# Patient Record
Sex: MALE | Race: WHITE | NOT HISPANIC OR LATINO | URBAN - METROPOLITAN AREA
[De-identification: names, ages, dates, MRNs, and addresses within clinical notes are randomized per-mention and may not be internally consistent; named-entity substitution may affect disease eponyms.]

---

## 2024-11-04 ENCOUNTER — OUTPATIENT (OUTPATIENT)
Dept: OUTPATIENT SERVICES | Facility: HOSPITAL | Age: 69
LOS: 1 days | End: 2024-11-04
Payer: MEDICARE

## 2024-11-04 ENCOUNTER — TRANSCRIPTION ENCOUNTER (OUTPATIENT)
Age: 69
End: 2024-11-04

## 2024-11-04 DIAGNOSIS — M25.521 PAIN IN RIGHT ELBOW: ICD-10-CM

## 2024-11-04 DIAGNOSIS — Z00.8 ENCOUNTER FOR OTHER GENERAL EXAMINATION: ICD-10-CM

## 2024-11-04 PROCEDURE — 76882 US LMTD JT/FCL EVL NVASC XTR: CPT | Mod: 26,RT

## 2024-11-04 PROCEDURE — 76882 US LMTD JT/FCL EVL NVASC XTR: CPT | Mod: RT

## 2024-11-05 DIAGNOSIS — M25.521 PAIN IN RIGHT ELBOW: ICD-10-CM

## 2024-11-14 PROBLEM — Z00.00 ENCOUNTER FOR PREVENTIVE HEALTH EXAMINATION: Status: ACTIVE | Noted: 2024-11-14

## 2024-12-10 ENCOUNTER — APPOINTMENT (OUTPATIENT)
Dept: ORTHOPEDIC SURGERY | Facility: CLINIC | Age: 69
End: 2024-12-10
Payer: MEDICARE

## 2024-12-10 DIAGNOSIS — M70.21 OLECRANON BURSITIS, RIGHT ELBOW: ICD-10-CM

## 2024-12-10 PROCEDURE — 99204 OFFICE O/P NEW MOD 45 MIN: CPT

## 2025-01-04 ENCOUNTER — INPATIENT (INPATIENT)
Facility: HOSPITAL | Age: 70
LOS: 0 days | Discharge: HOME | DRG: 195 | End: 2025-01-04
Attending: STUDENT IN AN ORGANIZED HEALTH CARE EDUCATION/TRAINING PROGRAM | Admitting: HOSPITALIST
Payer: MEDICARE

## 2025-01-04 VITALS
HEIGHT: 76 IN | WEIGHT: 207.9 LBS | HEART RATE: 90 BPM | RESPIRATION RATE: 20 BRPM | OXYGEN SATURATION: 98 % | TEMPERATURE: 99 F | DIASTOLIC BLOOD PRESSURE: 68 MMHG | SYSTOLIC BLOOD PRESSURE: 136 MMHG

## 2025-01-04 DIAGNOSIS — R29.6 REPEATED FALLS: ICD-10-CM

## 2025-01-04 LAB
ALBUMIN SERPL ELPH-MCNC: 4 G/DL — SIGNIFICANT CHANGE UP (ref 3.5–5.2)
ALP SERPL-CCNC: 72 U/L — SIGNIFICANT CHANGE UP (ref 30–115)
ALT FLD-CCNC: 16 U/L — SIGNIFICANT CHANGE UP (ref 0–41)
ANION GAP SERPL CALC-SCNC: 14 MMOL/L — SIGNIFICANT CHANGE UP (ref 7–14)
AST SERPL-CCNC: 23 U/L — SIGNIFICANT CHANGE UP (ref 0–41)
BASOPHILS # BLD AUTO: 0.04 K/UL — SIGNIFICANT CHANGE UP (ref 0–0.2)
BASOPHILS NFR BLD AUTO: 0.4 % — SIGNIFICANT CHANGE UP (ref 0–1)
BILIRUB SERPL-MCNC: 0.3 MG/DL — SIGNIFICANT CHANGE UP (ref 0.2–1.2)
BUN SERPL-MCNC: 15 MG/DL — SIGNIFICANT CHANGE UP (ref 10–20)
CALCIUM SERPL-MCNC: 8.8 MG/DL — SIGNIFICANT CHANGE UP (ref 8.4–10.5)
CHLORIDE SERPL-SCNC: 96 MMOL/L — LOW (ref 98–110)
CK SERPL-CCNC: 661 U/L — HIGH (ref 0–225)
CO2 SERPL-SCNC: 23 MMOL/L — SIGNIFICANT CHANGE UP (ref 17–32)
CREAT SERPL-MCNC: 1 MG/DL — SIGNIFICANT CHANGE UP (ref 0.7–1.5)
EGFR: 82 ML/MIN/1.73M2 — SIGNIFICANT CHANGE UP
EOSINOPHIL # BLD AUTO: 0.06 K/UL — SIGNIFICANT CHANGE UP (ref 0–0.7)
EOSINOPHIL NFR BLD AUTO: 0.6 % — SIGNIFICANT CHANGE UP (ref 0–8)
FLUAV AG NPH QL: DETECTED
FLUBV AG NPH QL: SIGNIFICANT CHANGE UP
GLUCOSE SERPL-MCNC: 133 MG/DL — HIGH (ref 70–99)
HCT VFR BLD CALC: 36 % — LOW (ref 42–52)
HGB BLD-MCNC: 12.6 G/DL — LOW (ref 14–18)
IMM GRANULOCYTES NFR BLD AUTO: 0.3 % — SIGNIFICANT CHANGE UP (ref 0.1–0.3)
LYMPHOCYTES # BLD AUTO: 0.99 K/UL — LOW (ref 1.2–3.4)
LYMPHOCYTES # BLD AUTO: 10.4 % — LOW (ref 20.5–51.1)
MCHC RBC-ENTMCNC: 30.1 PG — SIGNIFICANT CHANGE UP (ref 27–31)
MCHC RBC-ENTMCNC: 35 G/DL — SIGNIFICANT CHANGE UP (ref 32–37)
MCV RBC AUTO: 85.9 FL — SIGNIFICANT CHANGE UP (ref 80–94)
MONOCYTES # BLD AUTO: 1.35 K/UL — HIGH (ref 0.1–0.6)
MONOCYTES NFR BLD AUTO: 14.1 % — HIGH (ref 1.7–9.3)
NEUTROPHILS # BLD AUTO: 7.08 K/UL — HIGH (ref 1.4–6.5)
NEUTROPHILS NFR BLD AUTO: 74.2 % — SIGNIFICANT CHANGE UP (ref 42.2–75.2)
NRBC # BLD: 0 /100 WBCS — SIGNIFICANT CHANGE UP (ref 0–0)
PLATELET # BLD AUTO: 262 K/UL — SIGNIFICANT CHANGE UP (ref 130–400)
PMV BLD: 9.5 FL — SIGNIFICANT CHANGE UP (ref 7.4–10.4)
POTASSIUM SERPL-MCNC: 3.7 MMOL/L — SIGNIFICANT CHANGE UP (ref 3.5–5)
POTASSIUM SERPL-SCNC: 3.7 MMOL/L — SIGNIFICANT CHANGE UP (ref 3.5–5)
PROT SERPL-MCNC: 6.4 G/DL — SIGNIFICANT CHANGE UP (ref 6–8)
RBC # BLD: 4.19 M/UL — LOW (ref 4.7–6.1)
RBC # FLD: 12.7 % — SIGNIFICANT CHANGE UP (ref 11.5–14.5)
RSV RNA NPH QL NAA+NON-PROBE: SIGNIFICANT CHANGE UP
SARS-COV-2 RNA SPEC QL NAA+PROBE: SIGNIFICANT CHANGE UP
SODIUM SERPL-SCNC: 133 MMOL/L — LOW (ref 135–146)
WBC # BLD: 9.55 K/UL — SIGNIFICANT CHANGE UP (ref 4.8–10.8)
WBC # FLD AUTO: 9.55 K/UL — SIGNIFICANT CHANGE UP (ref 4.8–10.8)

## 2025-01-04 PROCEDURE — 73630 X-RAY EXAM OF FOOT: CPT | Mod: 26,LT

## 2025-01-04 PROCEDURE — 73030 X-RAY EXAM OF SHOULDER: CPT | Mod: 26,50

## 2025-01-04 PROCEDURE — 99285 EMERGENCY DEPT VISIT HI MDM: CPT | Mod: 25

## 2025-01-04 PROCEDURE — 71045 X-RAY EXAM CHEST 1 VIEW: CPT | Mod: 26

## 2025-01-04 PROCEDURE — 74177 CT ABD & PELVIS W/CONTRAST: CPT | Mod: 26,MC

## 2025-01-04 PROCEDURE — 73030 X-RAY EXAM OF SHOULDER: CPT | Mod: 50

## 2025-01-04 PROCEDURE — 71260 CT THORAX DX C+: CPT | Mod: 26,MC

## 2025-01-04 PROCEDURE — 72125 CT NECK SPINE W/O DYE: CPT | Mod: 26,MC

## 2025-01-04 PROCEDURE — 73552 X-RAY EXAM OF FEMUR 2/>: CPT | Mod: RT

## 2025-01-04 PROCEDURE — 70450 CT HEAD/BRAIN W/O DYE: CPT | Mod: 26,MC

## 2025-01-04 PROCEDURE — 72170 X-RAY EXAM OF PELVIS: CPT | Mod: 26

## 2025-01-04 PROCEDURE — 12002 RPR S/N/AX/GEN/TRNK2.6-7.5CM: CPT

## 2025-01-04 PROCEDURE — 73552 X-RAY EXAM OF FEMUR 2/>: CPT | Mod: 26,RT

## 2025-01-04 RX ORDER — CHLORHEXIDINE GLUCONATE 1.2 MG/ML
1 RINSE ORAL
Refills: 0 | Status: DISCONTINUED | OUTPATIENT
Start: 2025-01-04 | End: 2025-01-04

## 2025-01-04 RX ORDER — SODIUM CHLORIDE 9 MG/ML
1000 INJECTION, SOLUTION INTRAMUSCULAR; INTRAVENOUS; SUBCUTANEOUS ONCE
Refills: 0 | Status: COMPLETED | OUTPATIENT
Start: 2025-01-04 | End: 2025-01-04

## 2025-01-04 RX ADMIN — SODIUM CHLORIDE 1000 MILLILITER(S): 9 INJECTION, SOLUTION INTRAMUSCULAR; INTRAVENOUS; SUBCUTANEOUS at 05:42

## 2025-01-04 NOTE — H&P ADULT - NSHPLABSRESULTS_GEN_ALL_CORE
LABS:                        12.6   9.55  )-----------( 262      ( 04 Jan 2025 05:31 )             36.0     01-04    133[L]  |  96[L]  |  15  ----------------------------<  133[H]  3.7   |  23  |  1.0    Ca    8.8      04 Jan 2025 05:31    TPro  6.4  /  Alb  4.0  /  TBili  0.3  /  DBili  x   /  AST  23  /  ALT  16  /  AlkPhos  72  01-04    LIVER FUNCTIONS - ( 04 Jan 2025 05:31 )  Alb: 4.0 g/dL / Pro: 6.4 g/dL / ALK PHOS: 72 U/L / ALT: 16 U/L / AST: 23 U/L / GGT: x             Urinalysis Basic - ( 04 Jan 2025 05:31 )    Color: x / Appearance: x / SG: x / pH: x  Gluc: 133 mg/dL / Ketone: x  / Bili: x / Urobili: x   Blood: x / Protein: x / Nitrite: x   Leuk Esterase: x / RBC: x / WBC x   Sq Epi: x / Non Sq Epi: x / Bacteria: x  +++++++++++++++++++++++++++++++++++++++++++++++++++++++++++++++++++++++++++++++++++++++++++++++++  < from: CT Chest w/ IV Cont (01.04.25 @ 07:24) >    IMPRESSION:  No evidence of acute traumatic injury to the chest, abdomen or pelvis.    --- End of Report ---    FABIAN RUDD MD; Attending Radiologist  This document has been electronically signed. Jan 4 2025  8:00AM    < end of copied text >  +++++++++++++++++++++++++++++++++++++++++++++++++++++++++++++++++++++++++++++++++++++++++++++++++++  < from: CT Cervical Spine No Cont (01.04.25 @ 07:13) >  IMPRESSION:  CT HEAD:  1.  No evidence of acute intracranial pathology.    CT CERVICAL SPINE:  1.  No evidence of acute cervical spine traumatic injury.    --- End of Report ---    FABIAN RUDD MD; Attending Radiologist  This document has been electronically signed. Jan 4 2025  7:45AM  < end of copied text >    < from: Xray Pelvis AP only (01.04.25 @ 06:17) >  < end of copied text >

## 2025-01-04 NOTE — CHART NOTE - NSCHARTNOTEFT_GEN_A_CORE
Upon seeing the patient with ACP at bedside as well as daughter who reports she is a nurse - the daughter and the patient are not willing to be admitted to the hospital and requested to speak to ed staff and not admitting team. they prefer to go home and take care of the patient. reported the patient will be seeing by his podiatrist tomorrow and they have multiple family members to take care of him at home and declined admission for physical therapy to prevent falls understanding the risks.  Admission was declined by patient and family. Per chart review - patient was signed out AMA from emergency room. Please recall medicine prn should patient / family agree for being admitting and stay in the hospital.

## 2025-01-04 NOTE — CHART NOTE - NSCHARTNOTEFT_GEN_A_CORE
Patient was seen and evaluated in the ER with hospitalist Dr. Jean. Patient reports he was not aware of his admission and wishes to sign out AMA at this time.     The risks of refusing admission, treatment and seriousness of leaving AMA (such as the risks of heart attack, stroke, seizure, and even death) and their medical conditions, benefits, and alternatives to treatment was discussed with the patient and patients daughter.     Patients daughter at the bedside and patients wife Fatou via telephone who expresses that she is a nurse and will care for the patient at home. Family reached podiatrist Dr. Faulkner who suggested to see the patient in 1 day in the office.     Patient is AAO x 3, ambulating in the ER with a steady gait. On exam lungs CTA, no difficulty breathing.     Patient was advised to report to the ER with any worsening symptoms including chest pain, SOB, headache, fall.       Vital Signs Last 24 Hrs  T(C): 37.3 (04 Jan 2025 05:08), Max: 37.3 (04 Jan 2025 05:08)  T(F): 99.1 (04 Jan 2025 05:08), Max: 99.1 (04 Jan 2025 05:08)  HR: 90 (04 Jan 2025 05:08) (90 - 90)  BP: 136/68 (04 Jan 2025 05:08) (136/68 - 136/68)  RR: 20 (04 Jan 2025 05:08) (20 - 20)  SpO2: 98% (04 Jan 2025 05:08) (98% - 98%)    Parameters below as of 04 Jan 2025 05:08  Patient On (Oxygen Delivery Method): room air Patient was seen and evaluated in the ER with hospitalist Dr. Jean. Patient reports he was not aware of his admission and wishes to sign out AMA at this time.     The risks of refusing admission, treatment and seriousness of leaving AMA (such as the risks of heart attack, stroke, seizure, and even death) and their medical conditions, benefits, and alternatives to treatment was discussed with the patient and patients daughter.     Patients daughter at the bedside and patients wife Fatou via telephone who expresses that she is a nurse and will care for the patient at home. Family reached podiatrist Dr. Faulkner who suggested to see the patient in 1 day in the office.     Patient is AAO x 3 and has medical capacity to make decisions. Patient ambulating in the ER with a steady gait. On exam lungs CTA, no difficulty breathing.     Patient was advised to report to the ER with any worsening symptoms including chest pain, SOB, headache, fall.       Vital Signs Last 24 Hrs  T(C): 37.3 (04 Jan 2025 05:08), Max: 37.3 (04 Jan 2025 05:08)  T(F): 99.1 (04 Jan 2025 05:08), Max: 99.1 (04 Jan 2025 05:08)  HR: 90 (04 Jan 2025 05:08) (90 - 90)  BP: 136/68 (04 Jan 2025 05:08) (136/68 - 136/68)  RR: 20 (04 Jan 2025 05:08) (20 - 20)  SpO2: 98% (04 Jan 2025 05:08) (98% - 98%)    Parameters below as of 04 Jan 2025 05:08  Patient On (Oxygen Delivery Method): room air

## 2025-01-04 NOTE — ED ADULT TRIAGE NOTE - AVIAN FLU SYMPTOMS
Yes W Plasty Text: The lesion was extirpated to the level of the fat with a #15 scalpel blade. Given the location of the defect, shape of the defect and the proximity to free margins a W-plasty was deemed most appropriate for repair. Using a sterile surgical marker, the appropriate transposition arms of the W-plasty were drawn incorporating the defect and placing the expected incisions within the relaxed skin tension lines where possible. The area thus outlined was incised deep to adipose tissue with a #15 scalpel blade. The skin margins were undermined to an appropriate distance in all directions utilizing iris scissors. The opposing transposition arms were then transposed and carried over into place in opposite direction and anchored with interrupted buried subcutaneous sutures.

## 2025-01-04 NOTE — ED ADULT NURSE NOTE - NSFALLRISKINTERV_ED_ALL_ED
Assistance OOB with selected safe patient handling equipment if applicable/Assistance with ambulation/Communicate fall risk and risk factors to all staff, patient, and family/Monitor gait and stability/Provide visual cue: yellow wristband, yellow gown, etc/Reinforce activity limits and safety measures with patient and family/Call bell, personal items and telephone in reach/Instruct patient to call for assistance before getting out of bed/chair/stretcher/Non-slip footwear applied when patient is off stretcher/[place] to call system/Physically safe environment - no spills, clutter or unnecessary equipment/Purposeful Proactive Rounding/Room/bathroom lighting operational, light cord in reach

## 2025-01-04 NOTE — ED ADULT NURSE REASSESSMENT NOTE - NS ED NURSE REASSESS COMMENT FT1
saw medical team, refusing to continue with admission, AMA papers signed by admitting team and patient, IVL removed, left with family member

## 2025-01-04 NOTE — ED PROVIDER NOTE - CARE PLAN
1 Principal Discharge DX:	Influenza A  Secondary Diagnosis:	Frequent falls  Secondary Diagnosis:	Generalized weakness  Secondary Diagnosis:	Laceration of toe

## 2025-01-04 NOTE — ED PROVIDER NOTE - ATTENDING APP SHARED VISIT CONTRIBUTION OF CARE
69-year-old male, history of DM, RA, tested positive for flu, presents to the ED with multiple falls and weakness today.  Exam shows alert patient in no distress, HEENT NCAT PERRL, neck supple, throat no exudates, lungs clear, RR S1S2, abdomen soft NT +BS, no CCE, + laceration left distal second toe, neuro alert and oriented x 3, GCS 15, no deficits.

## 2025-01-04 NOTE — ED PROVIDER NOTE - PHYSICAL EXAMINATION
GENERAL: Well-nourished, Well-developed. NAD.  HEAD: No visible or palpable bumps or hematomas. No ecchymosis behind ears B/L.  Eyes: PERRLA, EOMI.   Neck: Supple. No cervical midline TTP. No paravertebral TTP to traps. FROM  CVS: Normal S1,S2. No murmurs appreciated on auscultation   RESP: No use of accessory muscles. Chest rise symmetrical with good expansion. Lungs clear to auscultation B/L. No wheezing, rales, or rhonchi auscultated.  GI: Normal auscultation of bowel sounds in all 4 quadrants. Soft, Nontender, Nondistended. No guarding or rebound tenderness. No CVAT B/L.  MSK: (+)R femur mild ttp. pelvis stable. FROM of upper and lower extremities B/L. No midline spinal TTP. FROM of back with flexion and extension.  Skin: (+)3cm laceration to distal 2nd toe, through the nail.   EXT: Radial and pedal pulses present B/L. No calf tenderness or swelling B/L. No palpable cords. No pedal edema B/L.  Neuro: NVI

## 2025-01-04 NOTE — ED PROVIDER NOTE - PROGRESS NOTE DETAILS
Signed out to Dr. Carvalho. TC: Received signout from Dr. Bunch. Pt here with URI sx and frequent falls. Tested flu + yesterday. Has had multiple falls at home from feeling weak. No prodromal sx. Endorses some cp from cough, no ischemic changes on ekg. Pan scan negative. L toe lac repaired. Xrays negative. Pt to be admitted for frequent falls.

## 2025-01-04 NOTE — ED PROVIDER NOTE - OBJECTIVE STATEMENT
69-year-old male PMH diabetes, RA presenting to ED for evaluation of generalized weakness and multiple falls in the past 24 hours.  [facility] and since that time is been increasingly weak at home has reported multiple falls.  Endorses a head injury.  Patient with a laceration to his left second toe from fall last night.  Denies any loss of consciousness, not on anticoagulation.

## 2025-01-04 NOTE — CHART NOTE - NSCHARTNOTEFT_GEN_A_CORE
Pt was already admitted to medicine and seen by hospitalist. He wants to leave AMA. Initially family members pushed for admission however now daughter at bedside says she will take him home with other family members and monitor him. Pt is awake, alert, interactive with normal mental status and neuro function. Pt denies SI/HI, demonstrates normal thought processes without evidence of intoxication, delirium, delusions, hallucinations. Pt requesting to leave AMA. Advised pt of potential risks of leaving AMA, including potential disability or death. Attempted to convince pt to stay and continue work up/tx and pt refused. Pt has capacity to make medical decisions at this time and will be signed out AMA. Instructed to f/u with PMD as outpt and is aware can return to the ED at any time for eval.

## 2025-01-04 NOTE — ED PROVIDER NOTE - CLINICAL SUMMARY MEDICAL DECISION MAKING FREE TEXT BOX
Received signout from Dr. Bunch. Pt here with URI sx and frequent falls. Tested flu + yesterday. Has had multiple falls at home from feeling weak. No prodromal sx. Endorses some cp from cough, no ischemic changes on ekg. Pan scan negative. L toe lac repaired. Xrays negative. Pt to be admitted for frequent falls given risk for worsening injuries, unsafe d/c home.

## 2025-01-04 NOTE — H&P ADULT - ASSESSMENT
Assessment:        Plan:    # Generalized weakness/ frequent falls   # Elevated CK   # Influenza A (+)  - Admit to inpatient level of care-med/surg  - Isolation precautions  - Start Tamiflu  - Robitussin PRN for cough  - Trend Creatine kinase   - Continue with IV fluids  - Fall risk  - Ambulate with assistance  - PT evaluation  - Social work   - Case management        # Laceration to L distal 2nd toe  - laceration repair done by ER team  - Podiatry consulted       CHG ordered.  VTE ppx: __  GI ppx: Not indicated.  Diet: DASH      Awaiting to discuss with Dr. Jean

## 2025-01-09 DIAGNOSIS — Z53.29 PROCEDURE AND TREATMENT NOT CARRIED OUT BECAUSE OF PATIENT'S DECISION FOR OTHER REASONS: ICD-10-CM

## 2025-01-09 DIAGNOSIS — J10.1 INFLUENZA DUE TO OTHER IDENTIFIED INFLUENZA VIRUS WITH OTHER RESPIRATORY MANIFESTATIONS: ICD-10-CM

## 2025-01-09 DIAGNOSIS — S91.115A LACERATION WITHOUT FOREIGN BODY OF LEFT LESSER TOE(S) WITHOUT DAMAGE TO NAIL, INITIAL ENCOUNTER: ICD-10-CM
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
## 2025-01-09 DIAGNOSIS — R29.6 REPEATED FALLS: ICD-10-CM

## 2025-01-09 DIAGNOSIS — E11.9 TYPE 2 DIABETES MELLITUS WITHOUT COMPLICATIONS: ICD-10-CM
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
## 2025-01-16 ENCOUNTER — APPOINTMENT (OUTPATIENT)
Dept: ORTHOPEDIC SURGERY | Facility: CLINIC | Age: 70
End: 2025-01-16
Payer: MEDICARE

## 2025-01-16 DIAGNOSIS — M25.511 PAIN IN RIGHT SHOULDER: ICD-10-CM

## 2025-01-16 DIAGNOSIS — M25.512 PAIN IN RIGHT SHOULDER: ICD-10-CM

## 2025-01-16 PROCEDURE — 73030 X-RAY EXAM OF SHOULDER: CPT | Mod: 50

## 2025-01-16 PROCEDURE — 99203 OFFICE O/P NEW LOW 30 MIN: CPT

## 2025-01-28 ENCOUNTER — APPOINTMENT (OUTPATIENT)
Dept: MRI IMAGING | Facility: CLINIC | Age: 70
End: 2025-01-28
Payer: MEDICARE

## 2025-01-28 PROCEDURE — 73221 MRI JOINT UPR EXTREM W/O DYE: CPT | Mod: LT

## 2025-01-29 NOTE — H&P ADULT - NSHPPHYSICALEXAM_GEN_ALL_CORE
CHW - Initial Contact    This Community Health Worker completed the Social Determinant of Health questionnaire with patient via telephone today.    Pt identified barriers of most importance are: Patient did not report any barriers at this time.   Referrals to community agencies completed with patient/caregiver consent outside of [facility] include: No.  Referrals were put through [facility] - no:   Support and Services: No.  Other information discussed the patient needs / wants help with: SDOH completed. Patient did not report any barriers at this time. Patient's case will be closed.    Follow up required: No.    
Vital Signs Last 24 Hrs  T(C): 37.3 (04 Jan 2025 05:08), Max: 37.3 (04 Jan 2025 05:08)  T(F): 99.1 (04 Jan 2025 05:08), Max: 99.1 (04 Jan 2025 05:08)  HR: 90 (04 Jan 2025 05:08) (90 - 90)  BP: 136/68 (04 Jan 2025 05:08) (136/68 - 136/68)  RR: 20 (04 Jan 2025 05:08) (20 - 20)  SpO2: 98% (04 Jan 2025 05:08) (98% - 98%)    Parameters below as of 04 Jan 2025 05:08  Patient On (Oxygen Delivery Method): room air    VITALS:     GENERAL: NAD, lying in bed comfortably  HEAD:  Atraumatic, Normocephalic  EYES: EOMI, PERRLA, conjunctiva and sclera clear  ENT: Moist mucous membranes  NECK: Supple, No JVD  CHEST/LUNG: Clear to auscultation bilaterally; No rales, rhonchi, wheezing, or rubs. Unlabored respirations  HEART: Regular rate and rhythm; No murmurs, rubs, or gallops  ABDOMEN: Bowel sounds present; Soft, Nontender, Nondistended. No hepatomegally  EXTREMITIES:  2+ Peripheral Pulses, brisk capillary refill. No clubbing, cyanosis, or edema  NERVOUS SYSTEM:  Alert & Oriented X3, speech clear. No deficits   MSK: FROM all 4 extremities, full and equal strength  SKIN: No rashes or lesions

## 2025-02-10 ENCOUNTER — INPATIENT (INPATIENT)
Facility: HOSPITAL | Age: 70
LOS: 3 days | Discharge: HOME HEALTH | DRG: 605 | End: 2025-02-14
Attending: STUDENT IN AN ORGANIZED HEALTH CARE EDUCATION/TRAINING PROGRAM | Admitting: INTERNAL MEDICINE
Payer: MEDICARE

## 2025-02-10 VITALS
SYSTOLIC BLOOD PRESSURE: 124 MMHG | WEIGHT: 199.96 LBS | OXYGEN SATURATION: 99 % | DIASTOLIC BLOOD PRESSURE: 72 MMHG | RESPIRATION RATE: 18 BRPM | HEIGHT: 76 IN | TEMPERATURE: 98 F | HEART RATE: 77 BPM

## 2025-02-10 DIAGNOSIS — S91.309A UNSPECIFIED OPEN WOUND, UNSPECIFIED FOOT, INITIAL ENCOUNTER: ICD-10-CM

## 2025-02-10 LAB
A1C WITH ESTIMATED AVERAGE GLUCOSE RESULT: 6.4 % — HIGH (ref 4–5.6)
ALBUMIN SERPL ELPH-MCNC: 4.2 G/DL — SIGNIFICANT CHANGE UP (ref 3.5–5.2)
ALP SERPL-CCNC: 75 U/L — SIGNIFICANT CHANGE UP (ref 30–115)
ALT FLD-CCNC: 13 U/L — SIGNIFICANT CHANGE UP (ref 0–41)
ANION GAP SERPL CALC-SCNC: 16 MMOL/L — HIGH (ref 7–14)
APTT BLD: 33.7 SEC — SIGNIFICANT CHANGE UP (ref 27–39.2)
AST SERPL-CCNC: 19 U/L — SIGNIFICANT CHANGE UP (ref 0–41)
BASOPHILS # BLD AUTO: 0.1 K/UL — SIGNIFICANT CHANGE UP (ref 0–0.2)
BASOPHILS NFR BLD AUTO: 1.1 % — HIGH (ref 0–1)
BILIRUB SERPL-MCNC: 0.3 MG/DL — SIGNIFICANT CHANGE UP (ref 0.2–1.2)
BUN SERPL-MCNC: 18 MG/DL — SIGNIFICANT CHANGE UP (ref 10–20)
CALCIUM SERPL-MCNC: 9.2 MG/DL — SIGNIFICANT CHANGE UP (ref 8.4–10.5)
CHLORIDE SERPL-SCNC: 101 MMOL/L — SIGNIFICANT CHANGE UP (ref 98–110)
CO2 SERPL-SCNC: 20 MMOL/L — SIGNIFICANT CHANGE UP (ref 17–32)
CREAT SERPL-MCNC: 1 MG/DL — SIGNIFICANT CHANGE UP (ref 0.7–1.5)
CRP SERPL-MCNC: 3.1 MG/L — SIGNIFICANT CHANGE UP
EGFR: 82 ML/MIN/1.73M2 — SIGNIFICANT CHANGE UP
EOSINOPHIL # BLD AUTO: 0.36 K/UL — SIGNIFICANT CHANGE UP (ref 0–0.7)
EOSINOPHIL NFR BLD AUTO: 3.8 % — SIGNIFICANT CHANGE UP (ref 0–8)
ERYTHROCYTE [SEDIMENTATION RATE] IN BLOOD: 18 MM/HR — HIGH (ref 0–10)
ESTIMATED AVERAGE GLUCOSE: 137 MG/DL — HIGH (ref 68–114)
GLUCOSE BLDC GLUCOMTR-MCNC: 129 MG/DL — HIGH (ref 70–99)
GLUCOSE SERPL-MCNC: 95 MG/DL — SIGNIFICANT CHANGE UP (ref 70–99)
HCT VFR BLD CALC: 36.6 % — LOW (ref 42–52)
HGB BLD-MCNC: 12.5 G/DL — LOW (ref 14–18)
IMM GRANULOCYTES NFR BLD AUTO: 0.4 % — HIGH (ref 0.1–0.3)
INR BLD: 0.94 RATIO — SIGNIFICANT CHANGE UP (ref 0.65–1.3)
LACTATE SERPL-SCNC: 2.4 MMOL/L — HIGH (ref 0.7–2)
LACTATE SERPL-SCNC: 2.7 MMOL/L — HIGH (ref 0.7–2)
LYMPHOCYTES # BLD AUTO: 2.12 K/UL — SIGNIFICANT CHANGE UP (ref 1.2–3.4)
LYMPHOCYTES # BLD AUTO: 22.3 % — SIGNIFICANT CHANGE UP (ref 20.5–51.1)
MCHC RBC-ENTMCNC: 29.9 PG — SIGNIFICANT CHANGE UP (ref 27–31)
MCHC RBC-ENTMCNC: 34.2 G/DL — SIGNIFICANT CHANGE UP (ref 32–37)
MCV RBC AUTO: 87.6 FL — SIGNIFICANT CHANGE UP (ref 80–94)
MONOCYTES # BLD AUTO: 0.81 K/UL — HIGH (ref 0.1–0.6)
MONOCYTES NFR BLD AUTO: 8.5 % — SIGNIFICANT CHANGE UP (ref 1.7–9.3)
NEUTROPHILS # BLD AUTO: 6.06 K/UL — SIGNIFICANT CHANGE UP (ref 1.4–6.5)
NEUTROPHILS NFR BLD AUTO: 63.9 % — SIGNIFICANT CHANGE UP (ref 42.2–75.2)
NRBC # BLD: 0 /100 WBCS — SIGNIFICANT CHANGE UP (ref 0–0)
NRBC BLD-RTO: 0 /100 WBCS — SIGNIFICANT CHANGE UP (ref 0–0)
PLATELET # BLD AUTO: 280 K/UL — SIGNIFICANT CHANGE UP (ref 130–400)
PMV BLD: 10.8 FL — HIGH (ref 7.4–10.4)
POTASSIUM SERPL-MCNC: 4.5 MMOL/L — SIGNIFICANT CHANGE UP (ref 3.5–5)
POTASSIUM SERPL-SCNC: 4.5 MMOL/L — SIGNIFICANT CHANGE UP (ref 3.5–5)
PROT SERPL-MCNC: 6.4 G/DL — SIGNIFICANT CHANGE UP (ref 6–8)
PROTHROM AB SERPL-ACNC: 11.1 SEC — SIGNIFICANT CHANGE UP (ref 9.95–12.87)
RBC # BLD: 4.18 M/UL — LOW (ref 4.7–6.1)
RBC # FLD: 13.4 % — SIGNIFICANT CHANGE UP (ref 11.5–14.5)
SODIUM SERPL-SCNC: 137 MMOL/L — SIGNIFICANT CHANGE UP (ref 135–146)
WBC # BLD: 9.49 K/UL — SIGNIFICANT CHANGE UP (ref 4.8–10.8)
WBC # FLD AUTO: 9.49 K/UL — SIGNIFICANT CHANGE UP (ref 4.8–10.8)

## 2025-02-10 PROCEDURE — 85025 COMPLETE CBC W/AUTO DIFF WBC: CPT

## 2025-02-10 PROCEDURE — 83605 ASSAY OF LACTIC ACID: CPT

## 2025-02-10 PROCEDURE — 73630 X-RAY EXAM OF FOOT: CPT | Mod: LT

## 2025-02-10 PROCEDURE — 99285 EMERGENCY DEPT VISIT HI MDM: CPT

## 2025-02-10 PROCEDURE — 87077 CULTURE AEROBIC IDENTIFY: CPT

## 2025-02-10 PROCEDURE — 73630 X-RAY EXAM OF FOOT: CPT | Mod: 26,LT

## 2025-02-10 PROCEDURE — 87186 SC STD MICRODIL/AGAR DIL: CPT

## 2025-02-10 PROCEDURE — 82962 GLUCOSE BLOOD TEST: CPT

## 2025-02-10 PROCEDURE — 85610 PROTHROMBIN TIME: CPT

## 2025-02-10 PROCEDURE — 97116 GAIT TRAINING THERAPY: CPT | Mod: GP

## 2025-02-10 PROCEDURE — 99223 1ST HOSP IP/OBS HIGH 75: CPT

## 2025-02-10 PROCEDURE — 97530 THERAPEUTIC ACTIVITIES: CPT | Mod: GP

## 2025-02-10 PROCEDURE — 87070 CULTURE OTHR SPECIMN AEROBIC: CPT

## 2025-02-10 PROCEDURE — 36415 COLL VENOUS BLD VENIPUNCTURE: CPT

## 2025-02-10 PROCEDURE — 88305 TISSUE EXAM BY PATHOLOGIST: CPT

## 2025-02-10 PROCEDURE — 97162 PT EVAL MOD COMPLEX 30 MIN: CPT | Mod: GP

## 2025-02-10 PROCEDURE — 87075 CULTR BACTERIA EXCEPT BLOOD: CPT

## 2025-02-10 PROCEDURE — 73719 MRI LOWER EXTREMITY W/DYE: CPT | Mod: MC,LT

## 2025-02-10 PROCEDURE — 80053 COMPREHEN METABOLIC PANEL: CPT

## 2025-02-10 PROCEDURE — 88311 DECALCIFY TISSUE: CPT

## 2025-02-10 PROCEDURE — A9579: CPT

## 2025-02-10 PROCEDURE — 93926 LOWER EXTREMITY STUDY: CPT | Mod: 26,LT

## 2025-02-10 RX ORDER — ACETAMINOPHEN 160 MG/5ML
650 SUSPENSION ORAL EVERY 6 HOURS
Refills: 0 | Status: DISCONTINUED | OUTPATIENT
Start: 2025-02-10 | End: 2025-02-13

## 2025-02-10 RX ORDER — METFORMIN HYDROCHLORIDE 1000 MG/1
1 TABLET, COATED ORAL
Refills: 0 | DISCHARGE

## 2025-02-10 RX ORDER — ACETAMINOPHEN, DIPHENHYDRAMINE HCL, PHENYLEPHRINE HCL 325; 25; 5 MG/1; MG/1; MG/1
5 TABLET ORAL AT BEDTIME
Refills: 0 | Status: DISCONTINUED | OUTPATIENT
Start: 2025-02-10 | End: 2025-02-13

## 2025-02-10 RX ORDER — BUPROPION HYDROCHLORIDE 150 MG/1
150 TABLET, EXTENDED RELEASE ORAL DAILY
Refills: 0 | Status: DISCONTINUED | OUTPATIENT
Start: 2025-02-10 | End: 2025-02-13

## 2025-02-10 RX ORDER — HYDROCHLOROTHIAZIDE 50 MG
10 TABLET ORAL
Refills: 0 | DISCHARGE

## 2025-02-10 RX ORDER — BACTERIOSTATIC SODIUM CHLORIDE 0.9 %
1000 VIAL (ML) INJECTION ONCE
Refills: 0 | Status: COMPLETED | OUTPATIENT
Start: 2025-02-10 | End: 2025-02-10

## 2025-02-10 RX ORDER — ENALAPRIL MALEATE 20 MG
30 TABLET ORAL DAILY
Refills: 0 | Status: DISCONTINUED | OUTPATIENT
Start: 2025-02-10 | End: 2025-02-13

## 2025-02-10 RX ORDER — PIPERACILLIN SODIUM AND TAZOBACTAM SODIUM 2; 250 G/50ML; MG/50ML
3.38 INJECTION, POWDER, FOR SOLUTION INTRAVENOUS EVERY 8 HOURS
Refills: 0 | Status: DISCONTINUED | OUTPATIENT
Start: 2025-02-10 | End: 2025-02-11

## 2025-02-10 RX ORDER — HYDROCHLOROTHIAZIDE 50 MG
1 TABLET ORAL
Refills: 0 | DISCHARGE

## 2025-02-10 RX ORDER — ENALAPRIL MALEATE 20 MG
10 TABLET ORAL DAILY
Refills: 0 | Status: DISCONTINUED | OUTPATIENT
Start: 2025-02-10 | End: 2025-02-10

## 2025-02-10 RX ORDER — DESVENLAFAXINE 100 MG/1
1 TABLET, FILM COATED, EXTENDED RELEASE ORAL
Refills: 0 | DISCHARGE

## 2025-02-10 RX ORDER — ENALAPRIL MALEATE 20 MG
1 TABLET ORAL
Refills: 0 | DISCHARGE

## 2025-02-10 RX ORDER — AMLODIPINE BESYLATE 5 MG
1 TABLET ORAL
Refills: 0 | DISCHARGE

## 2025-02-10 RX ORDER — ACETAMINOPHEN, DIPHENHYDRAMINE HCL, PHENYLEPHRINE HCL 325; 25; 5 MG/1; MG/1; MG/1
3 TABLET ORAL AT BEDTIME
Refills: 0 | Status: DISCONTINUED | OUTPATIENT
Start: 2025-02-10 | End: 2025-02-10

## 2025-02-10 RX ORDER — BUPROPION HYDROCHLORIDE 150 MG/1
1 TABLET, EXTENDED RELEASE ORAL
Refills: 0 | DISCHARGE

## 2025-02-10 RX ORDER — PIPERACILLIN SODIUM AND TAZOBACTAM SODIUM 2; 250 G/50ML; MG/50ML
3.38 INJECTION, POWDER, FOR SOLUTION INTRAVENOUS ONCE
Refills: 0 | Status: COMPLETED | OUTPATIENT
Start: 2025-02-10 | End: 2025-02-10

## 2025-02-10 RX ORDER — SEMAGLUTIDE 0.25 MG/.5ML
1 INJECTION, SOLUTION SUBCUTANEOUS
Refills: 0 | DISCHARGE

## 2025-02-10 RX ORDER — AMLODIPINE BESYLATE 5 MG
10 TABLET ORAL DAILY
Refills: 0 | Status: DISCONTINUED | OUTPATIENT
Start: 2025-02-10 | End: 2025-02-13

## 2025-02-10 RX ADMIN — PIPERACILLIN SODIUM AND TAZOBACTAM SODIUM 25 GRAM(S): 2; 250 INJECTION, POWDER, FOR SOLUTION INTRAVENOUS at 22:54

## 2025-02-10 RX ADMIN — PIPERACILLIN SODIUM AND TAZOBACTAM SODIUM 200 GRAM(S): 2; 250 INJECTION, POWDER, FOR SOLUTION INTRAVENOUS at 16:05

## 2025-02-10 RX ADMIN — ACETAMINOPHEN, DIPHENHYDRAMINE HCL, PHENYLEPHRINE HCL 5 MILLIGRAM(S): 325; 25; 5 TABLET ORAL at 23:20

## 2025-02-10 RX ADMIN — Medication 1000 MILLILITER(S): at 17:10

## 2025-02-10 NOTE — ED ADULT NURSE NOTE - CHIEF COMPLAINT QUOTE
Admission c/o wound check . Patient had a laceration in January , came  in for non healed wound and for IV antibiotics

## 2025-02-10 NOTE — H&P ADULT - NSHPLABSRESULTS_GEN_ALL_CORE
LABS:                        12.5   9.49  )-----------( 280      ( 10 Feb 2025 15:34 )             36.6     02-10    137  |  101  |  18  ----------------------------<  95  4.5   |  20  |  1.0    Ca    9.2      10 Feb 2025 15:34    TPro  6.4  /  Alb  4.2  /  TBili  0.3  /  DBili  x   /  AST  19  /  ALT  13  /  AlkPhos  75  02-10    PT/INR - ( 10 Feb 2025 16:25 )   PT: 11.10 sec;   INR: 0.94 ratio         PTT - ( 10 Feb 2025 16:25 )  PTT:33.7 sec  Urinalysis Basic - ( 10 Feb 2025 15:34 )    Color: x / Appearance: x / SG: x / pH: x  Gluc: 95 mg/dL / Ketone: x  / Bili: x / Urobili: x   Blood: x / Protein: x / Nitrite: x   Leuk Esterase: x / RBC: x / WBC x   Sq Epi: x / Non Sq Epi: x / Bacteria: x        Sedimentation Rate, Erythrocyte: 18 mm/Hr *H* (02-10-25 @ 16:25)  Lactate, Blood: 2.4 mmol/L *H* (02-10-25 @ 16:25)

## 2025-02-10 NOTE — ED PROVIDER NOTE - PHYSICAL EXAMINATION
Gen: NAD, AOx3  Head: NCAT  HEENT: PERRL, oral mucosa moist, normal conjunctiva, oropharynx clear without exudate or erythema  Lung: CTAB, no respiratory distress, no wheezing, rales, rhonchi  CV: normal s1/s2, rrr, Normal perfusion, pulses 2+ throughout  Abd: soft, NTND, no CVA tenderness  Genitourinary: no pelvic tenderness  MSK: No edema, no visible deformities, full range of motion in all 4 extremities  Neuro: CN II-XII grossly intact, No focal neurologic deficits  Skin: No rash, open wound to L 2nd toe with no active drainage/erythema/streaking/crepitus  Psych: normal affect

## 2025-02-10 NOTE — H&P ADULT - ASSESSMENT
69-year-old male with a past medical history of hypertension, diabetes, rheumatoid arthritis sent in by podiatry for left foot wound.  Patient obtained an laceration to his left second toe the beginning of last month and has experienced trouble healing since then.     69-year-old male with a past medical history of hypertension, diabetes, rheumatoid arthritis sent in by podiatry for left foot wound.  Patient obtained an laceration to his left second toe the beginning of last month and has experienced trouble healing since then.    #L. foot second digit toe wound  #Rule out osteomyelitis vs superficial infection  - wound started in January as a laceration, was on op Abx and received stiches  - wound not healing and getting worse despite treatment  - Foot xray: No definite acute displaced fracture or dislocation. Tarsal/metatarsal   alignment appears maintained. Stable postoperative changes of the great   toe and metatarsals. Osteopenia and degenerative changes without   difference  - Podiatry saw in ED: recommended MRI L foot for osteo for possible sx vs long term abx    plan:  - F/u MRI left foot   - c/w empiric zosyn   - f/u labs   - f/u [facility] arterial duplex  - f/u ID c/s     #HTN  - c/w home meds     #Diabities  - ISS    #RA  - used to take Enbrel but stopped    DVT: none  PPI  Diet: regular  dispo: pending MRI  69-year-old male with a past medical history of hypertension, diabetes, rheumatoid arthritis sent in by podiatry for left foot wound.  Patient obtained an laceration to his left second toe the beginning of last month and has experienced trouble healing since then.    #L. foot second digit toe wound  #Rule out osteomyelitis vs superficial infection  - wound started in January as a laceration, was on op Abx and received stiches  - wound not healing and getting worse despite treatment  - Foot xray: No definite acute displaced fracture or dislocation. Tarsal/metatarsal   alignment appears maintained. Stable postoperative changes of the great   toe and metatarsals. Osteopenia and degenerative changes without   difference  - Podiatry saw in ED: recommended MRI L foot for osteo for possible sx vs long term abx    plan:  - F/u MRI left foot  - c/w empiric zosyn   - f/u labs   - f/u [facility] arterial duplex  - f/u ID c/s     #HTN  - c/w home meds     #Diabetes  - ISS  - metformin and Ozempic at home    #RA  - used to take Enbrel but stopped taking a month ago for concerns of side effects    #depression/ anxiety  - takes buproprion  and pristique at home    DVT: none  PPI:  Diet: regular  dispo: pending MRI

## 2025-02-10 NOTE — ED ADULT TRIAGE NOTE - CHIEF COMPLAINT QUOTE
c/o wound check . Patient had a laceration in January , came  in for non healed wound and for IV antibiotics

## 2025-02-10 NOTE — ED PROVIDER NOTE - ATTENDING APP SHARED VISIT CONTRIBUTION OF CARE
I have reviewed and agree with the mid-level note, except as documented in my note below.    69-year-old male history of HTN, DM, RA (not compliant with his immunomodulator), PSH significant for prior orthopedic surgeries, non-smoker, denies daily alcohol use, now presents with pain to left second toe, states had laceration to the pad of his toe in January, since then has been nonhealing, denies fever, tactile temp, chills, paresthesias, focal weakness, or other associated complaints at present. Pt denies recent heavy lifting or trauma. Old chart reviewed. I have reviewed and agree with the initial nursing note, except as documented in my note.    VSS, awake, alert, non-toxic appearing, chest CTAB, non-labored breathing, no w/r/r, +S1/S2, RRR, no m/r/g, abdomen soft, NT, ND, +BS, no peripheral edema or deformities, LLE; no hip, knee (including prox tib-fib) tenderness, ankle/foot; appears symmetrical, no gross deformity, crepitus, swelling or point tenderness, ROM intact, no joint laxity appreciated, motor and sensation intact distally, NV intact with 2+ DP pulses. There is no pain on palpation of the achilles tendon. No warmth, + erythema of toe, no induration, fluctuance, lymphangitis or purulence.    XR neg for fx, d/w pt, rec RICE, outpt f/u with ortho next available appt for further evaluation and management.

## 2025-02-10 NOTE — ED PROVIDER NOTE - OBJECTIVE STATEMENT
69-year-old male with a past medical history of hypertension, diabetes, rheumatoid arthritis sent in by podiatry for left foot wound.  Patient obtained an laceration to his left second toe the beginning of last month and has experienced trouble healing since then. pt denies any other symptoms including fevers, chill, headache, recent illness/travel, cough, abdominal pain, chest pain, or SOB.

## 2025-02-10 NOTE — H&P ADULT - NSHPPHYSICALEXAM_GEN_ALL_CORE
GENERAL: NAD, lying in bed comfortably  HEAD:  Atraumatic, Normocephalic  EYES: conjunctiva and sclera clear  CHEST/LUNG: Clear to auscultation bilaterally; No wheezing, or rubs. Unlabored respirations  HEART: Regular rate and rhythm; No murmurs, rubs, or gallops  ABDOMEN: BSx4; Soft, nontender, nondistended  EXTREMITIES: Open wound to L 2nd toe with no active drainage/erythema/streaking/crepitus, 2+ DP  NERVOUS SYSTEM:  A&Ox3, no focal deficits   SKIN: No rashes or lesions

## 2025-02-10 NOTE — H&P ADULT - HISTORY OF PRESENT ILLNESS
69-year-old male with a past medical history of hypertension, diabetes, rheumatoid arthritis sent in by podiatry for left foot wound.  Patient obtained an laceration to his left second toe the beginning of last month and has experienced trouble healing since then. pt denies any other symptoms including fevers, chill, headache, recent illness/travel, cough, abdominal pain, chest pain, or SOB.    ED vitals: 124/72 HR: 77 RR: 18 T:98 SpO2: 99%    Labs: ESR 18 / wbc 9.49 / lactate 2.4     Xray of foot:  Arterial LE duplex:     s/p zosyn, 1L NS bolus 69-year-old male with a past medical history of hypertension, diabetes, rheumatoid arthritis sent in by podiatry for left foot wound.  Patient obtained an laceration to his left second toe the beginning of last month and has experienced trouble healing since then. pt denies any other symptoms including fevers, chill, headache, recent illness/travel, cough, abdominal pain, chest pain, or SOB.    ED vitals: 124/72 HR: 77 RR: 18 T:98 SpO2: 99%    Labs: ESR 18 / wbc 9.49 / lactate 2.4     Xray of foot:  No definite acute displaced fracture or dislocation. Tarsal/metatarsal   alignment appears maintained. Stable postoperative changes of the great   toe and metatarsals. Osteopenia and degenerative changes without   difference    Arterial LE duplex: Pending    s/p zosyn, 1L NS bolus

## 2025-02-10 NOTE — ED PROVIDER NOTE - CLINICAL SUMMARY MEDICAL DECISION MAKING FREE TEXT BOX
Independent interpretation of the X-Ray film(s) performed by MD Pagan    This patient presents with initial presentation of local erythema, warmth, swelling concerning for cellulitis. Sensitivity/pain to light touch around the erythematous area. No lymphangitic spread visible and no fluid pockets or fluctuance concerning for abscess noted. Concern for osteomyelitis. No bullae, pain out of proportion, or rapid progression concerning for necrotizing fasciitis. Plan to admit for further evaluation and management.

## 2025-02-10 NOTE — H&P ADULT - ATTENDING COMMENTS
69-year-old male with a past medical history of hypertension, diabetes, rheumatoid arthritis sent in by podiatry for left foot wound.  Patient obtained an laceration to his left second toe the beginning of last month and has experienced trouble healing since then.      Agree  with assessment  except for changes below.   Vital Signs Last 24 Hrs  T(C): 36.7 (10 Feb 2025 17:20), Max: 36.7 (10 Feb 2025 13:49)  T(F): 98 (10 Feb 2025 17:20), Max: 98 (10 Feb 2025 13:49)  HR: 67 (10 Feb 2025 17:20) (67 - 77)  BP: 134/72 (10 Feb 2025 17:20) (124/72 - 134/72)  BP(mean): 93 (10 Feb 2025 17:20) (93 - 93)  RR: 18 (10 Feb 2025 17:20) (18 - 18)  SpO2: 100% (10 Feb 2025 17:20) (99% - 100%)    Parameters below as of 10 Feb 2025 17:20  Patient On (Oxygen Delivery Method): room air    Foot Xray:   No definite acute displaced fracture or dislocation. Tarsal/metatarsal   alignment appears maintained. Stable postoperative changes of the great   toe and metatarsals. Osteopenia and degenerative changes without   difference    IMPRESSION  Left  Foot  Second  Digit  Toe  Wound   ESR  18 CRP 3.1  Low suspicion for Acute OM  Elevated Lactate  2nd digit open wound to distal aspect of toe, no purulence, no malodor, no drainage Localized erythema and edema limited to the digit,  Podiatry saw in ED: recommended MRI L foot for osteo  - F/u MRI left foot   - c/w empiric zosyn   - f/u labs   - f/u [facility] duplex  - f/u ID c/s 69-year-old male with a past medical history of hypertension, diabetes, rheumatoid arthritis sent in by podiatry for left foot wound.  Patient obtained an laceration to his left second toe the beginning of last month and has experienced trouble healing since then.      Agree  with assessment  except for changes below.   Vital Signs Last 24 Hrs  T(C): 36.7 (10 Feb 2025 17:20), Max: 36.7 (10 Feb 2025 13:49)  T(F): 98 (10 Feb 2025 17:20), Max: 98 (10 Feb 2025 13:49)  HR: 67 (10 Feb 2025 17:20) (67 - 77)  BP: 134/72 (10 Feb 2025 17:20) (124/72 - 134/72)  BP(mean): 93 (10 Feb 2025 17:20) (93 - 93)  RR: 18 (10 Feb 2025 17:20) (18 - 18)  SpO2: 100% (10 Feb 2025 17:20) (99% - 100%)    Parameters below as of 10 Feb 2025 17:20  Patient On (Oxygen Delivery Method): room air    Foot Xray:   No definite acute displaced fracture or dislocation. Tarsal/metatarsal   alignment appears maintained. Stable postoperative changes of the great   toe and metatarsals. Osteopenia and degenerative changes without   difference    IMPRESSION  Left  Foot  Second  Digit  Toe  Wound   ESR  18 CRP 3.1  Low suspicion for Acute OM  Elevated Lactate  2nd digit open wound to distal aspect of toe, no purulence, no malodor, no drainage Localized erythema and edema limited to the digit,  Podiatry saw in ED: recommended MRI L foot for osteo  - F/u MRI left foot   - c/w empiric zosyn   - f/u labs   - f/u [facility] duplex  - f/u ID c/s      Seen on 02/10

## 2025-02-11 LAB
ALBUMIN SERPL ELPH-MCNC: 3.7 G/DL — SIGNIFICANT CHANGE UP (ref 3.5–5.2)
ALP SERPL-CCNC: 65 U/L — SIGNIFICANT CHANGE UP (ref 30–115)
ALT FLD-CCNC: 11 U/L — SIGNIFICANT CHANGE UP (ref 0–41)
ANION GAP SERPL CALC-SCNC: 11 MMOL/L — SIGNIFICANT CHANGE UP (ref 7–14)
AST SERPL-CCNC: 12 U/L — SIGNIFICANT CHANGE UP (ref 0–41)
BASOPHILS # BLD AUTO: 0.09 K/UL — SIGNIFICANT CHANGE UP (ref 0–0.2)
BASOPHILS NFR BLD AUTO: 1.2 % — HIGH (ref 0–1)
BILIRUB SERPL-MCNC: 0.3 MG/DL — SIGNIFICANT CHANGE UP (ref 0.2–1.2)
BUN SERPL-MCNC: 17 MG/DL — SIGNIFICANT CHANGE UP (ref 10–20)
CALCIUM SERPL-MCNC: 9.1 MG/DL — SIGNIFICANT CHANGE UP (ref 8.4–10.4)
CHLORIDE SERPL-SCNC: 103 MMOL/L — SIGNIFICANT CHANGE UP (ref 98–110)
CO2 SERPL-SCNC: 25 MMOL/L — SIGNIFICANT CHANGE UP (ref 17–32)
CREAT SERPL-MCNC: 1 MG/DL — SIGNIFICANT CHANGE UP (ref 0.7–1.5)
EGFR: 82 ML/MIN/1.73M2 — SIGNIFICANT CHANGE UP
EOSINOPHIL # BLD AUTO: 0.4 K/UL — SIGNIFICANT CHANGE UP (ref 0–0.7)
EOSINOPHIL NFR BLD AUTO: 5.3 % — SIGNIFICANT CHANGE UP (ref 0–8)
GLUCOSE BLDC GLUCOMTR-MCNC: 118 MG/DL — HIGH (ref 70–99)
GLUCOSE BLDC GLUCOMTR-MCNC: 126 MG/DL — HIGH (ref 70–99)
GLUCOSE BLDC GLUCOMTR-MCNC: 133 MG/DL — HIGH (ref 70–99)
GLUCOSE BLDC GLUCOMTR-MCNC: 135 MG/DL — HIGH (ref 70–99)
GLUCOSE SERPL-MCNC: 103 MG/DL — HIGH (ref 70–99)
HCT VFR BLD CALC: 33.4 % — LOW (ref 42–52)
HGB BLD-MCNC: 11.5 G/DL — LOW (ref 14–18)
IMM GRANULOCYTES NFR BLD AUTO: 0.3 % — SIGNIFICANT CHANGE UP (ref 0.1–0.3)
INR BLD: 1 RATIO — SIGNIFICANT CHANGE UP (ref 0.65–1.3)
LYMPHOCYTES # BLD AUTO: 2.17 K/UL — SIGNIFICANT CHANGE UP (ref 1.2–3.4)
LYMPHOCYTES # BLD AUTO: 28.8 % — SIGNIFICANT CHANGE UP (ref 20.5–51.1)
MCHC RBC-ENTMCNC: 30 PG — SIGNIFICANT CHANGE UP (ref 27–31)
MCHC RBC-ENTMCNC: 34.4 G/DL — SIGNIFICANT CHANGE UP (ref 32–37)
MCV RBC AUTO: 87.2 FL — SIGNIFICANT CHANGE UP (ref 80–94)
MONOCYTES # BLD AUTO: 0.8 K/UL — HIGH (ref 0.1–0.6)
MONOCYTES NFR BLD AUTO: 10.6 % — HIGH (ref 1.7–9.3)
NEUTROPHILS # BLD AUTO: 4.05 K/UL — SIGNIFICANT CHANGE UP (ref 1.4–6.5)
NEUTROPHILS NFR BLD AUTO: 53.8 % — SIGNIFICANT CHANGE UP (ref 42.2–75.2)
NRBC # BLD: 0 /100 WBCS — SIGNIFICANT CHANGE UP (ref 0–0)
NRBC BLD-RTO: 0 /100 WBCS — SIGNIFICANT CHANGE UP (ref 0–0)
PLATELET # BLD AUTO: 273 K/UL — SIGNIFICANT CHANGE UP (ref 130–400)
PMV BLD: 10.6 FL — HIGH (ref 7.4–10.4)
POTASSIUM SERPL-MCNC: 3.8 MMOL/L — SIGNIFICANT CHANGE UP (ref 3.5–5)
POTASSIUM SERPL-SCNC: 3.8 MMOL/L — SIGNIFICANT CHANGE UP (ref 3.5–5)
PROT SERPL-MCNC: 5.9 G/DL — LOW (ref 6–8)
PROTHROM AB SERPL-ACNC: 11.8 SEC — SIGNIFICANT CHANGE UP (ref 9.95–12.87)
RBC # BLD: 3.83 M/UL — LOW (ref 4.7–6.1)
RBC # FLD: 13.3 % — SIGNIFICANT CHANGE UP (ref 11.5–14.5)
SODIUM SERPL-SCNC: 139 MMOL/L — SIGNIFICANT CHANGE UP (ref 135–146)
WBC # BLD: 7.53 K/UL — SIGNIFICANT CHANGE UP (ref 4.8–10.8)
WBC # FLD AUTO: 7.53 K/UL — SIGNIFICANT CHANGE UP (ref 4.8–10.8)

## 2025-02-11 PROCEDURE — 73719 MRI LOWER EXTREMITY W/DYE: CPT | Mod: 26,LT

## 2025-02-11 PROCEDURE — 99232 SBSQ HOSP IP/OBS MODERATE 35: CPT

## 2025-02-11 RX ORDER — AMPICILLIN SODIUM AND SULBACTAM SODIUM 2; 1 G/1; G/1
3 INJECTION, POWDER, FOR SOLUTION INTRAMUSCULAR; INTRAVENOUS ONCE
Refills: 0 | Status: COMPLETED | OUTPATIENT
Start: 2025-02-11 | End: 2025-02-11

## 2025-02-11 RX ORDER — DM/PSEUDOEPHED/ACETAMINOPHEN 10-30-250
25 CAPSULE ORAL ONCE
Refills: 0 | Status: DISCONTINUED | OUTPATIENT
Start: 2025-02-11 | End: 2025-02-13

## 2025-02-11 RX ORDER — SODIUM CHLORIDE 9 G/ML
1000 INJECTION, SOLUTION INTRAVENOUS
Refills: 0 | Status: DISCONTINUED | OUTPATIENT
Start: 2025-02-11 | End: 2025-02-13

## 2025-02-11 RX ORDER — AMPICILLIN SODIUM AND SULBACTAM SODIUM 2; 1 G/1; G/1
INJECTION, POWDER, FOR SOLUTION INTRAMUSCULAR; INTRAVENOUS
Refills: 0 | Status: DISCONTINUED | OUTPATIENT
Start: 2025-02-11 | End: 2025-02-13

## 2025-02-11 RX ORDER — ENOXAPARIN SODIUM 100 MG/ML
40 INJECTION SUBCUTANEOUS EVERY 24 HOURS
Refills: 0 | Status: DISCONTINUED | OUTPATIENT
Start: 2025-02-11 | End: 2025-02-13

## 2025-02-11 RX ORDER — INSULIN LISPRO 100/ML
VIAL (ML) SUBCUTANEOUS
Refills: 0 | Status: DISCONTINUED | OUTPATIENT
Start: 2025-02-11 | End: 2025-02-13

## 2025-02-11 RX ORDER — DM/PSEUDOEPHED/ACETAMINOPHEN 10-30-250
12.5 CAPSULE ORAL ONCE
Refills: 0 | Status: DISCONTINUED | OUTPATIENT
Start: 2025-02-11 | End: 2025-02-13

## 2025-02-11 RX ORDER — ANTISEPTIC SURGICAL SCRUB 0.04 MG/ML
1 SOLUTION TOPICAL
Refills: 0 | Status: DISCONTINUED | OUTPATIENT
Start: 2025-02-11 | End: 2025-02-13

## 2025-02-11 RX ORDER — GLUCAGON 3 MG/1
1 POWDER NASAL ONCE
Refills: 0 | Status: DISCONTINUED | OUTPATIENT
Start: 2025-02-11 | End: 2025-02-13

## 2025-02-11 RX ORDER — AMPICILLIN SODIUM AND SULBACTAM SODIUM 2; 1 G/1; G/1
3 INJECTION, POWDER, FOR SOLUTION INTRAMUSCULAR; INTRAVENOUS EVERY 6 HOURS
Refills: 0 | Status: DISCONTINUED | OUTPATIENT
Start: 2025-02-11 | End: 2025-02-13

## 2025-02-11 RX ORDER — DM/PSEUDOEPHED/ACETAMINOPHEN 10-30-250
15 CAPSULE ORAL ONCE
Refills: 0 | Status: DISCONTINUED | OUTPATIENT
Start: 2025-02-11 | End: 2025-02-13

## 2025-02-11 RX ADMIN — ENOXAPARIN SODIUM 40 MILLIGRAM(S): 100 INJECTION SUBCUTANEOUS at 17:23

## 2025-02-11 RX ADMIN — Medication 30 MILLIGRAM(S): at 05:22

## 2025-02-11 RX ADMIN — AMPICILLIN SODIUM AND SULBACTAM SODIUM 200 GRAM(S): 2; 1 INJECTION, POWDER, FOR SOLUTION INTRAMUSCULAR; INTRAVENOUS at 09:00

## 2025-02-11 RX ADMIN — PIPERACILLIN SODIUM AND TAZOBACTAM SODIUM 25 GRAM(S): 2; 250 INJECTION, POWDER, FOR SOLUTION INTRAVENOUS at 05:23

## 2025-02-11 RX ADMIN — Medication 10 MILLIGRAM(S): at 05:23

## 2025-02-11 RX ADMIN — BUPROPION HYDROCHLORIDE 150 MILLIGRAM(S): 150 TABLET, EXTENDED RELEASE ORAL at 11:24

## 2025-02-11 RX ADMIN — Medication 1 MILLIGRAM(S): at 21:19

## 2025-02-11 RX ADMIN — AMPICILLIN SODIUM AND SULBACTAM SODIUM 200 GRAM(S): 2; 1 INJECTION, POWDER, FOR SOLUTION INTRAMUSCULAR; INTRAVENOUS at 11:24

## 2025-02-11 RX ADMIN — ACETAMINOPHEN, DIPHENHYDRAMINE HCL, PHENYLEPHRINE HCL 5 MILLIGRAM(S): 325; 25; 5 TABLET ORAL at 21:19

## 2025-02-11 RX ADMIN — ANTISEPTIC SURGICAL SCRUB 1 APPLICATION(S): 0.04 SOLUTION TOPICAL at 11:27

## 2025-02-11 RX ADMIN — AMPICILLIN SODIUM AND SULBACTAM SODIUM 200 GRAM(S): 2; 1 INJECTION, POWDER, FOR SOLUTION INTRAMUSCULAR; INTRAVENOUS at 17:23

## 2025-02-11 RX ADMIN — Medication 1 MILLIGRAM(S): at 14:35

## 2025-02-11 NOTE — CONSULT NOTE ADULT - ASSESSMENT
69-year-old male with a past medical history of hypertension, diabetes, rheumatoid arthritis sent in by podiatry for left foot wound.  Patient obtained an laceration to his left second toe the beginning of last month and has experienced trouble healing since then. pt denies any other symptoms including fevers, chill, headache, recent illness/travel, cough, abdominal pain, chest pain, or SOB.    ED vitals: 124/72 HR: 77 RR: 18 T:98 SpO2: 99%    Labs: ESR 18 / wbc 9.49 / lactate 2.4     Xray of foot:  No definite acute displaced fracture or dislocation. Tarsal/metatarsal   alignment appears maintained. Stable postoperative changes of the great   toe and metatarsals. Osteopenia and degenerative changes without   difference    All recent tests and past cultures reviewed.  Prior documents reviewed  Imaging reviewed  Discussed with primary team  Antibiotic based on resistance of microbes identified.     IMPRESSION/RECOMMENDATIONS  Immunosuppression/Immunosenescence ( above age 60 yrs there is a exponential decline in immunity which could result in poor clinical outcomes.   Acute illness which poses a threat bodily function ( might need an amputation of distal phalalnx ) without treatment   R foot 2nd toe with cellulitis  R/o underlying chronic OM of distal phalanx  No abscess   No PAD  2/10 XR of foot : No definite acute displaced fracture or dislocation. ( Independent interpretation of test : no OM  CBC :  ( WBC 7.5  ), ( Hg 11.5   ), CMP ( Cr 1.0   ) reviewed .   ESR 18    -Off loading to prevent pressure sores and preventive measures to avoid aspiration  -FU with Podiatry ( discussed with Podiatry at the bedside. Sx based on MRI findings )   -obtain an MRI of toe  -start Unasyn 3 gm iv q6h    Discussion of management/test results/antibiotic regimen  with primary medical team. Dr Kay  Discussion of management/test results/antibiotic regimen  with his wife at the bedside   69-year-old male with a past medical history of hypertension, diabetes, rheumatoid arthritis sent in by podiatry for left foot wound.  Patient obtained an laceration to his left second toe the beginning of last month and has experienced trouble healing since then. pt denies any other symptoms including fevers, chill, headache, recent illness/travel, cough, abdominal pain, chest pain, or SOB.    ED vitals: 124/72 HR: 77 RR: 18 T:98 SpO2: 99%    Labs: ESR 18 / wbc 9.49 / lactate 2.4     Xray of foot:  No definite acute displaced fracture or dislocation. Tarsal/metatarsal   alignment appears maintained. Stable postoperative changes of the great   toe and metatarsals. Osteopenia and degenerative changes without   difference    All recent tests and past cultures reviewed.  Prior documents reviewed  Imaging reviewed  Discussed with primary team  Antibiotic based on resistance of microbes identified.     IMPRESSION/RECOMMENDATIONS  Immunosuppression/Immunosenescence ( above age 60 yrs there is a exponential decline in immunity which could result in poor clinical outcomes.   Acute illness which poses a threat bodily function ( might need an amputation of distal phalalnx ) without treatment   R foot 2nd toe with cellulitis  R/o underlying chronic OM of distal phalanx  No abscess   No PAD  2/10 XR of foot : No definite acute displaced fracture or dislocation. ( Independent interpretation of test : no OM  CBC :  ( WBC 7.5  ), ( Hg 11.5   ), CMP ( Cr 1.0   ) reviewed .   ESR 18    hypertension  Diabetes  Rheumatoid arthritis    -Off loading to prevent pressure sores and preventive measures to avoid aspiration  -FU with Podiatry ( discussed with Podiatry at the bedside. Sx based on MRI findings )   -obtain an MRI of toe  -start Unasyn 3 gm iv q6h    Discussion of management/test results/antibiotic regimen  with primary medical team. Dr Kay  Discussion of management/test results/antibiotic regimen  with his wife at the bedside

## 2025-02-11 NOTE — CONSULT NOTE ADULT - RESPIRATORY
normal/clear to auscultation bilaterally/no wheezes/no rales/no rhonchi
tested + on 2/2. symptoms since 1/31, c/o HA, bilat blurry vision and right sided abdominal pain

## 2025-02-11 NOTE — CONSULT NOTE ADULT - NS ATTEST RISK PROBLEM GEN_ALL_CORE FT
-My assessment required my independent history taking and time required is independent of the teaching service  -I independently interpreted the most recent imaging ( CXR ) and all available labs ( CBC, CMP) and cultures ( along with the sensitivities / ROYCE )  -I reviewed all prior tests and documents  -I discussed my recommendations with the primary team housestaff/Attending  -I assisted with initiation of antibiotics

## 2025-02-11 NOTE — PROGRESS NOTE ADULT - ASSESSMENT
69-year-old male with a past medical history of hypertension, diabetes, rheumatoid arthritis sent in by podiatry for left foot wound.  Patient obtained an laceration to his left second toe the beginning of last month and has experienced trouble healing since then.    #L foot second digit toe wound r/o osteomyelitis vs superficial infection  - Foot xray: No definite acute displaced fracture or dislocation. Tarsal/metatarsal   alignment appears maintained. Stable postoperative changes of the great   toe and metatarsals. Osteopenia and degenerative changes without   difference  - Podiatry saw in ED: recommended MRI L foot for osteo for possible sx vs long term abx  - LABS: ESR 18, CRP 3.4   plan:  - F/u MRI left foot  - f/u [facility] duplex  - ID: Unasyn 3 gm IV q6     #HTN  - c/w home meds     #Diabetes  - A1C 6.4%  - ISS  - metformin and Ozempic at home    #RA  - used to take Enbrel but stopped taking a month ago for concerns of side effects    #depression/ anxiety  - takes buproprion  and pristique at home    DVT:    PPI:  Diet: regular  dispo: pending MRI

## 2025-02-11 NOTE — CONSULT NOTE ADULT - ADDITIONAL PE
L foot : excellent pulses. 2nd toe distal phalanx with ulcer with edema/erythema of toe, no drainage

## 2025-02-11 NOTE — PROGRESS NOTE ADULT - SUBJECTIVE AND OBJECTIVE BOX
SUBJECTIVE/OVERNIGHT EVENTS  Today is hospital day 1d. This morning patient was seen and examined at bedside, resting comfortably in bed. No acute or major events overnight.        MEDICATIONS  STANDING MEDICATIONS  amLODIPine   Tablet 10 milliGRAM(s) Oral daily  buPROPion XL (24-Hour) . 150 milliGRAM(s) Oral daily  dextrose 5%. 1000 milliLiter(s) IV Continuous <Continuous>  dextrose 5%. 1000 milliLiter(s) IV Continuous <Continuous>  dextrose 50% Injectable 25 Gram(s) IV Push once  dextrose 50% Injectable 12.5 Gram(s) IV Push once  dextrose 50% Injectable 25 Gram(s) IV Push once  enalapril 30 milliGRAM(s) Oral daily  glucagon  Injectable 1 milliGRAM(s) IntraMuscular once  hydrochlorothiazide 12.5 milliGRAM(s) Oral daily  insulin lispro (ADMELOG) corrective regimen sliding scale   SubCutaneous three times a day before meals  melatonin 5 milliGRAM(s) Oral at bedtime  piperacillin/tazobactam IVPB.. 3.375 Gram(s) IV Intermittent every 8 hours    PRN MEDICATIONS  acetaminophen     Tablet .. 650 milliGRAM(s) Oral every 6 hours PRN  dextrose Oral Gel 15 Gram(s) Oral once PRN  LORazepam     Tablet 1 milliGRAM(s) Oral daily PRN  oxycodone    5 mG/acetaminophen 325 mG 1 Tablet(s) Oral every 6 hours PRN    VITALS  T(F): 97.8 (02-11-25 @ 05:56), Max: 98 (02-10-25 @ 13:49)  HR: 68 (02-11-25 @ 05:56) (67 - 77)  BP: 118/54 (02-11-25 @ 05:56) (118/54 - 134/72)  RR: 18 (02-11-25 @ 05:56) (18 - 18)  SpO2: 97% (02-11-25 @ 05:56) (97% - 100%)  POCT Blood Glucose.: 118 mg/dL (02-11-25 @ 07:44)  POCT Blood Glucose.: 129 mg/dL (02-10-25 @ 21:38)      PHYSICAL EXAM:  GENERAL: NAD, well-groomed, well-developed  HEAD:  Atraumatic, Normocephalic  EYES: EOMI, PERRLA, conjunctiva and sclera clear  ENMT:  Moist mucous membranes  NECK: Supple, No JVD,  CHEST/LUNG: Clear to auscultation bilaterally  HEART: Regular rate and rhythm  ABDOMEN: Soft, Nontender, Nondistended; Bowel sounds present  NEURO:  MENTAL STATUS: AAOx3  LANG/SPEECH: Fluent, intact naming, repetition & comprehension  CRANIAL NERVES:  II: Pupils equal and reactive, no RAPD, normal visual field and fundus  III, IV, VI: EOM intact, no gaze preference or deviation  V: normal  VII: no facial asymmetry  VIII: normal hearing to speech  MOTOR: 5/5 in both upper and lower extremities  REFLEXES: 2/4 throughout  SENSORY: Normal to touch  COORD: Normal finger to nose   Gait:   EXTREMITIES: No LE edema, no calf tenderness  LYMPH: No lymphadenopathy noted  SKIN: No rashes or lesions         LABS             11.5   7.53  )-----------( 273      ( 02-11-25 @ 06:48 )             33.4     137  |  101  |  18  -------------------------<  95   02-10-25 @ 15:34  4.5  |  20  |  1.0    Ca      9.2     02-10-25 @ 15:34    TPro  6.4  /  Alb  4.2  /  TBili  0.3  /  DBili  x   /  AST  19  /  ALT  13  /  AlkPhos  75  /  GGT  x     02-10-25 @ 15:34    PT/INR - ( 02-11-25 @ 06:48 )   PT: 11.80 sec;   INR: 1.00 ratio  PTT - ( 02-10-25 @ 16:25 )  PTT:33.7 sec      Urinalysis Basic - ( 10 Feb 2025 15:34 )    Color: x / Appearance: x / SG: x / pH: x  Gluc: 95 mg/dL / Ketone: x  / Bili: x / Urobili: x   Blood: x / Protein: x / Nitrite: x   Leuk Esterase: x / RBC: x / WBC x   Sq Epi: x / Non Sq Epi: x / Bacteria: x          IMAGING      ASSESSMENT AND PLAN:                                                             SUBJECTIVE/OVERNIGHT EVENTS  Today is hospital day 1d. This morning patient was seen and examined at bedside, resting comfortably in bed. No acute or major events overnight.        MEDICATIONS  STANDING MEDICATIONS  amLODIPine   Tablet 10 milliGRAM(s) Oral daily  buPROPion XL (24-Hour) . 150 milliGRAM(s) Oral daily  dextrose 5%. 1000 milliLiter(s) IV Continuous <Continuous>  dextrose 5%. 1000 milliLiter(s) IV Continuous <Continuous>  dextrose 50% Injectable 25 Gram(s) IV Push once  dextrose 50% Injectable 12.5 Gram(s) IV Push once  dextrose 50% Injectable 25 Gram(s) IV Push once  enalapril 30 milliGRAM(s) Oral daily  glucagon  Injectable 1 milliGRAM(s) IntraMuscular once  hydrochlorothiazide 12.5 milliGRAM(s) Oral daily  insulin lispro (ADMELOG) corrective regimen sliding scale   SubCutaneous three times a day before meals  melatonin 5 milliGRAM(s) Oral at bedtime  piperacillin/tazobactam IVPB.. 3.375 Gram(s) IV Intermittent every 8 hours    PRN MEDICATIONS  acetaminophen     Tablet .. 650 milliGRAM(s) Oral every 6 hours PRN  dextrose Oral Gel 15 Gram(s) Oral once PRN  LORazepam     Tablet 1 milliGRAM(s) Oral daily PRN  oxycodone    5 mG/acetaminophen 325 mG 1 Tablet(s) Oral every 6 hours PRN    VITALS  T(F): 97.8 (02-11-25 @ 05:56), Max: 98 (02-10-25 @ 13:49)  HR: 68 (02-11-25 @ 05:56) (67 - 77)  BP: 118/54 (02-11-25 @ 05:56) (118/54 - 134/72)  RR: 18 (02-11-25 @ 05:56) (18 - 18)  SpO2: 97% (02-11-25 @ 05:56) (97% - 100%)  POCT Blood Glucose.: 118 mg/dL (02-11-25 @ 07:44)  POCT Blood Glucose.: 129 mg/dL (02-10-25 @ 21:38)      PHYSICAL EXAM:  GENERAL: NAD, well-groomed, well-developed  HEAD:  Atraumatic, Normocephalic  EYES: EOMI, PERRLA,   ENMT:  Moist mucous membranes  NECK: Supple, No JVD,  CHEST/LUNG: Clear to auscultation bilaterally  HEART: Regular rate and rhythm  ABDOMEN: Soft, Nontender  NEURO:  MENTAL STATUS: AAOx3  EXTREMITIES: L toe without abscess , no erythema No LE edema, no calf tenderness  LYMPH: No lymphadenopathy noted  SKIN: No rashes or lesions         LABS             11.5   7.53  )-----------( 273      ( 02-11-25 @ 06:48 )             33.4     137  |  101  |  18  -------------------------<  95   02-10-25 @ 15:34  4.5  |  20  |  1.0    Ca      9.2     02-10-25 @ 15:34    TPro  6.4  /  Alb  4.2  /  TBili  0.3  /  DBili  x   /  AST  19  /  ALT  13  /  AlkPhos  75  /  GGT  x     02-10-25 @ 15:34    PT/INR - ( 02-11-25 @ 06:48 )   PT: 11.80 sec;   INR: 1.00 ratio  PTT - ( 02-10-25 @ 16:25 )  PTT:33.7 sec      Urinalysis Basic - ( 10 Feb 2025 15:34 )    Color: x / Appearance: x / SG: x / pH: x  Gluc: 95 mg/dL / Ketone: x  / Bili: x / Urobili: x   Blood: x / Protein: x / Nitrite: x   Leuk Esterase: x / RBC: x / WBC x   Sq Epi: x / Non Sq Epi: x / Bacteria: x          IMAGING      ASSESSMENT AND PLAN:

## 2025-02-11 NOTE — CONSULT NOTE ADULT - CONSTITUTIONAL
Post Acute Skilled Nursing Home subsequent visit Note     Date of Service: 12/27/2022  [facility]  Subacute / Skilled Need: Rehabilitation    PCP: Shona Beach MD   Patient Care Team:  Shona Beach MD as PCP - General (Internal Medicine)  Elieser Kumar MD (Gastroenterology)  Alexandre Mtz MD as Radiation Oncology (Radiation Oncology)  Manuel Hu MD as Post Acute Facility Provider: Physician (Family Practice)  Jonh Simpson CNP as Post Acute Facility Provider: APC (Nurse Practitioner)  Attending SNF MD: Dr. Hu  Seen by Manuel hu MD today    Jessica Downs is a 74 year old female presenting to Post Acute Skilled Nursing for: PT/OT.   History of Present Illness:   Patient is 74 years old female with past medical history significant for chronic kidney disease, diabetes, heart failure, hyperlipidemia, and pulmonary hypertension.  Patient is poor historian.  Stating that she is sleepy and does not want to provide further history as she did not sleep in the emergency room and would like torest.  Most of history was obtained from reviewing medical records.  According to notes from home health nurse patient was hospitalized at an outside facility last month for uncontrolled found to be dehydrated with ROBERT and hypernatremia with UTI.  Patient also developed altered mental status and had 3 episodes of seizures.  She then developed acute hypoxic respiratory failure secondary to pneumonia and had to be intubated.  She was found to be COVID-positive.  CT head and MRI of the brain both with no acute changes.  Patient at a later point had bilateral upper extremity DVT and started on heparin drip.  She developed hematoma to the anterior wall of the chest and subsequently the anticoagulation was discontinued due to acute anemia.  Patient was then discharged to subacute rehab subsequently went home where she developed diarrhea and her primary care physician has been treating her for  presumed C. difficile colitis with no improvement of her diarrhea.  Patient did state that she is not having any abdominal pain she did acknowledge to watery diarrhea.  She did not have any chest pain or shortness of breath.  Denies any headache or vision changes.  Denies any urinary symptoms.  No further questions were answered by the patient at this point.    Above information copied, pt was at [facility] from 12/2-12/17, prior to hospitalizationn pt was in another SNF for a couple of week,  Pt seen in her room, alert, able to answer questions, pt is a poor historian, states she will be going to live with her son/POA, in a house,  PT reports she had recently been driving,  Pt also reportedly had urinary retention at previous SNF, and was being discharged home with O2,  Pt reports that she cont to have some loose stools, pt is on a tapering dose of po vanco    12/20/2022:  Initial physician visit in skilled rehab  Inpatient [facility] 12/2/2022 through 12/17/2022  Inpatient [facility] 11/6/2022 through 11/30/2022  Inpatient [facility] 10/17/2022 through 11/6/2022    74-year-old female with multiple hospital and skilled nursing admissions over the past 2 months.  She has a past medical history which includes CKD, diabetes, CHF, pulmonary hypertension, urinary retention, seizure disorder, chronic C. difficile.  Please see above note for current hospitalization course.  She is currently being managed for chronic C. difficile on a vancomycin taper, urinary retention with new Chandler catheter placed during this most recent hospitalization, pneumonia and acute hypoxic respiratory failure, DVT, anemia and debility.  She is transition to the skilled rehab for physical therapy.  Currently physical therapy reports patient walking 40 feet with min assist, transfers mod assist, ADLs standby to max.  Patient denies any acute issues at this time.  Discussed with patient attempt to wean O2 when she is requesting  a humidifier at her bedside.  She feels the air to dry causes her to be stuffy which impedes her ability to wean from the oxygen.      Pt seen in her room, in bed, alert, but forgetful, reviewed VS, BS running low, FG 82 today,  Pt believes her diarrhea episodes are decreasing, d/w pt attempting voiding trail next week   Therapy reports pt walked 40 feet with CGA with a RW  D/w pt low albumin and elevated BUN, enc to eat and push [id number]     22      HISTORY  Past Medical History:   Diagnosis Date   • Alcohol dependence (CMS/HCC) YEARS AGO   • Aortic stenosis    • Chronic kidney disease    • Cirrhosis (CMS/HCC)    • Degenerative arthritis    • Diabetes (CMS/HCC)    • Diastolic heart failure (CMS/HCC)    • Hypercholesterolemia    • Hypothyroid    • Malignant neoplasm (CMS/HCC) 2021    HCC- bx proven at [facility]   • Obesity    • Pulmonary hypertension (CMS/HCC)    • Sleep apnea     USES CPAP ON AND OFF   • Stricture and stenosis of esophagus       reports that she has quit smoking. Her smoking use included cigarettes. She has never used smokeless tobacco. She reports that she does not currently use alcohol. She reports that she does not use drugs.  Past Surgical History:   Procedure Laterality Date   •  section, classic       Family History   Problem Relation Age of Onset   • Patient is unaware of any medical problems Mother    • Patient is unaware of any medical problems Father      History     Not marked as reviewed during this visit.          PROBLEM LIST:  Specialty Problems    None       ADVANCE DIRECTIVES:  Jessica Downs     Decision Maker ([facility]#[id number])     Value   User Date/Time Recorded    Self  Ale Andres RN 2021 09:41:07          Do you have an AD? ([facility]#[id number])     Value   User Date/Time Recorded    No  Ale Andres RN 2021 09:41:07          Patient's desire to create an advance directive ([facility]#[id number])     Value   User Date/Time Recorded    Patient does not wish to  complete at this time  Ale Andres, RN 7/21/2021 09:41:07              Power of  Status:  Activated  Code Status:  FULL CODE  Goals of Care: stabilize  Advanced Directive Forms: discussed       DEPRESSION SCREENING:  PHQ 2/9 Test Results  0: Not at all  1: Several days  2: More than half the days  3: Nearly every day  Recent Review Flowsheet Data    There is no flowsheet data to display.         DEPRESSION ASSESSMENT/PLAN:  Depression screening is negative no further plan needed.    ALLERGIES:  Allergies as of 12/27/2022 - Reviewed 12/02/2022   Allergen Reaction Noted   • Morphine sulfate VOMITING 10/16/2020   • Lactose intolerance   (food or med) GI UPSET 12/02/2022       CURRENT HOME  MEDICATIONS:   Current Outpatient Medications   Medication Sig Dispense Refill   • vancomycin (VANCOCIN) 125 MG capsule Take 1 capsule by mouth 4 times daily for 7 days, THEN 1 capsule 2 times daily for 7 days, THEN 1 capsule daily for 7 days, THEN 1 capsule every 48 hours for 14 days. 56 capsule 0   • insulin glargine (LANTUS) 100 UNIT/ML vial solution Inject 15 Units into the skin nightly. 10 mL 12   • metFORMIN (GLUCOPHAGE-XR) 500 MG 24 hr tablet Take 500 mg by mouth daily.     • glipiZIDE (GLUCOTROL) 10 MG tablet Take 20 mg by mouth in the morning and 20 mg in the evening. Take before meals.     • atorvastatin (LIPITOR) 40 MG tablet Take 40 mg by mouth daily.     • albuterol-ipratropium (COMBIVENT RESPIMAT) 100-20 MCG/ACT inhaler Inhale 2 puffs into the lungs every 6 hours. 4 g 0   • budesonide-formoterol (SYMBICORT) 160-4.5 MCG/ACT inhaler Inhale 2 puffs into the lungs in the morning and 2 puffs in the evening. 10.2 g 0   • cholestyramine/aspartame (QUESTRAN LIGHT) 4 g packet Take 1 packet by mouth in the morning and 1 packet in the evening. Take with meals. 60 packet 0   • levETIRAcetam (KEPPRA) 750 MG tablet Take 1 tablet by mouth in the morning and 1 tablet in the evening. 60 tablet 0   • levothyroxine 100 MCG  tablet Take 1 tablet by mouth daily. 30 tablet 0   • pantoprazole (PROTONIX) 40 MG tablet Take 1 tablet by mouth daily. 30 tablet 0   • acetaminophen (TYLENOL) 325 MG tablet Take 2 tablets by mouth in the morning and 2 tablets at noon and 2 tablets in the evening.       No current facility-administered medications for this visit.       CURRENT SNF MEDICATIONS:   Reviewed and reconciled      BASELINE FUNCTIONAL STATUS:  Walker    CURRENT FUNCTIONAL STATUS:  Weight bearing as tolerated (WBAT)    DIET:  Consistency: General   Type: DM diet, cardiac diet  Appetite: Less than usual    REVIEW OF SYSTEMS:  Review of Systems   Constitutional: Positive for activity change. Negative for chills.   HENT: Negative for congestion and sinus pain.    Eyes: Negative for visual disturbance.   Respiratory: Positive for cough. Negative for shortness of breath.    Cardiovascular: Negative for chest pain and leg swelling.   Gastrointestinal: Negative for abdominal pain, blood in stool, constipation and nausea.   Genitourinary: Negative.    Musculoskeletal: Positive for gait problem. Negative for back pain.   Skin: Negative for rash and wound.   Neurological: Negative for weakness and headaches.   Psychiatric/Behavioral: Negative.        PHYSICAL ASSESSMENT:  Physical Exam  Vitals and nursing note reviewed.   Constitutional:       General: She is not in acute distress.     Appearance: She is obese.      Comments: Pt seen in bed, wearing O2 at 3 L, alert   HENT:      Head: Normocephalic and atraumatic.      Nose: Nose normal.      Mouth/Throat:      Mouth: Mucous membranes are moist.      Neck: Normal range of motion and neck supple.   Eyes:      Extraocular Movements: Extraocular movements intact.   Cardiovascular:      Rate and Rhythm: Normal rate and regular rhythm.      Pulses: Normal pulses.      Heart sounds: Normal heart sounds.   Pulmonary:      Effort: Pulmonary effort is normal.      Breath sounds: Normal breath sounds.    Abdominal:      Palpations: Abdomen is soft.      Tenderness: There is no abdominal tenderness. There is no guarding.   Musculoskeletal:         General: Normal range of motion.   Skin:     General: Skin is warm and dry.   Neurological:      General: No focal deficit present.      Mental Status: She is alert and oriented to person, place, and time.   Psychiatric:         Mood and Affect: Mood normal.         VITALS:  Visit Vitals  BP (!) 160/63   Pulse 86   Temp 98.6 °F (37 °C)   Resp 18   SpO2 98%     Pain Level 0 /10    LABS:  12/20/2022:  WBC 5.16, Hgb 8.6, , BUN 29, CR 1.05, alk phos 293, SGOT 62    Hospital labs:  12/17  BMP  Na 140 K 5.3, BUN/cr 34/1.18, glu 124  CBC  8.7/27, WBC 4.8, plt 178      ASSESSMENT AND PLAN  Diagnoses and associated orders for this visit:  1. Urinary retention  2. Stage 3b chronic kidney disease (CMS/[facility])  3. Debility  4. C. difficile diarrhea  5. Acute UTI  6. Insulin dependent type 2 diabetes mellitus (CMS/[facility])        1.Urinary retention  Chandler catheter was removed and patient urinating without difficulty  Continue to monitor  Follow-up urology if indicated    2.  Cough/pneumonia  Patient reports cough daily chronic  Congestion which she is unable to expectorate-well managed with albuterol nebs which have helped in the past and monitor    3.  Hx Cdiff  Denies active diarrhea   Continue vanco taper-infectious disease consulted in facility  Cont cholestyramine  Encourage fluids    4.  Debility  Patient improved  Scheduled for discharged home on 12/30/2022 with good family support in place  Discussed patient's desire to have bedside commode so she can begin practicing  Continue rehab in facility at this point time and will continue with rehab post discharge at next level of care      5.  ROBERT on CKD  Current renal function remains stable  Monitor and avoid nephrotoxins    6. DM with CKD  Accu-Chek 84  Cont lantus, , metformin  Decreased glipizide dose to 5mg BID and patient  normal/well-groomed/no distress still with very tight control  Consider DC glipizide  Monitor Accu-Cheks     7.  COPD  Cont Symbicort and Combivent  Wean O2  Respiratory and pulmonary to follow      FOLLOW UP APPOINTMENTS:  No follow-ups on file.    DISCHARGE PLANNING:   SS to assist with discharge planning    Discussed with: RN / Nursing, Patient, SW/ and Reviewed old records   Reviewed case in IDT with physical therapy, case management, nursing, APN,     Prognosis: good    Total time spent is more than 25 minutes, with more than 50% of the time spent in coordination of care, counseling, review of records and discussion of plan of care with the patient /staff /family.

## 2025-02-11 NOTE — PROGRESS NOTE ADULT - SUBJECTIVE AND OBJECTIVE BOX
Podiatry Progress Note    Subjective:  ESDRAS RAMIREZ is a  69y Male.   Seen bedside.   Patient is a 69y old  Male who presents with a chief complaint of left second digit toe wound (11 Feb 2025 08:23)      Past Medical History and Surgical History  PAST MEDICAL & SURGICAL HISTORY:       Objective:  Vital Signs Last 24 Hrs  T(C): 36.6 (11 Feb 2025 05:56), Max: 36.7 (10 Feb 2025 13:49)  T(F): 97.8 (11 Feb 2025 05:56), Max: 98 (10 Feb 2025 13:49)  HR: 68 (11 Feb 2025 05:56) (67 - 77)  BP: 118/54 (11 Feb 2025 05:56) (118/54 - 134/72)  BP(mean): 75 (11 Feb 2025 05:56) (75 - 93)  RR: 18 (11 Feb 2025 05:56) (18 - 18)  SpO2: 97% (11 Feb 2025 05:56) (97% - 100%)    Parameters below as of 11 Feb 2025 05:56  Patient On (Oxygen Delivery Method): room air                            11.5   7.53  )-----------( 273      ( 11 Feb 2025 06:48 )             33.4                 02-11    139  |  103  |  17  ----------------------------<  103[H]  3.8   |  25  |  1.0    Ca    9.1      11 Feb 2025 06:48    TPro  5.9[L]  /  Alb  3.7  /  TBili  0.3  /  DBili  x   /  AST  12  /  ALT  11  /  AlkPhos  65  02-11        Physical Exam Left Lower Extremity Focused:   Derm:   Full thickness wound to the distal aspect of the 2nd digit. No purulence, no malodor, no drainage. Pink epithelial tissue noted.   - Localized erythema and edema limited to the digit, extending to digit base - improved  Vascular: DP and PT Pulses Palpable; Foot is Warm to the touch; Capillary Refill Time < 3 Seconds;    Neuro: Protective Sensation Diminished / Moderately Neuropathic   MSK: No pain On Palpation at Wound Site     XR LEFT FOOT   FINDINGS/  IMPRESSION:  No definite acute displaced fracture or dislocation. Tarsal/metatarsal alignment appears maintained. Stable postoperative changes of the great toe and metatarsals. Osteopenia and degenerative changes without difference    Assessment:  Left Foot 2nd Digit Ulceration, Infected     Plan:  Chart reviewed and Patient evaluated. All Questions and Concerns Addressed and Answered  XR Imaging LEFT Foot; Reviewed Results  Weight Bearing Status; WBAT   MRI LEFT foot pending  Arterial duplex: pending read.  Possible surgical intervention pending MRI results.  Discussed in length possible options for treatment pending MRI results: Surgical intervention of amputation of the distal phalanx vs PO or IV abx.  Patient understands and agrees to the plan. Will update with MRI findings.  Patient seen and evaluated w/ Dr. Angulo, DPM      Podiatry   Please message on teams for further questions     Podiatry Progress Note    Subjective:  ESDRAS RAMIREZ is a  69y Male.   Seen bedside.   Patient is a 69y old  Male who presents with a chief complaint of left second digit toe wound (11 Feb 2025 08:23)      Past Medical History and Surgical History  PAST MEDICAL & SURGICAL HISTORY:       Objective:  Vital Signs Last 24 Hrs  T(C): 36.6 (11 Feb 2025 05:56), Max: 36.7 (10 Feb 2025 13:49)  T(F): 97.8 (11 Feb 2025 05:56), Max: 98 (10 Feb 2025 13:49)  HR: 68 (11 Feb 2025 05:56) (67 - 77)  BP: 118/54 (11 Feb 2025 05:56) (118/54 - 134/72)  BP(mean): 75 (11 Feb 2025 05:56) (75 - 93)  RR: 18 (11 Feb 2025 05:56) (18 - 18)  SpO2: 97% (11 Feb 2025 05:56) (97% - 100%)    Parameters below as of 11 Feb 2025 05:56  Patient On (Oxygen Delivery Method): room air                            11.5   7.53  )-----------( 273      ( 11 Feb 2025 06:48 )             33.4                 02-11    139  |  103  |  17  ----------------------------<  103[H]  3.8   |  25  |  1.0    Ca    9.1      11 Feb 2025 06:48    TPro  5.9[L]  /  Alb  3.7  /  TBili  0.3  /  DBili  x   /  AST  12  /  ALT  11  /  AlkPhos  65  02-11        Physical Exam Left Lower Extremity Focused:   Derm:   Full thickness wound to the distal aspect of the 2nd digit. No purulence, no malodor, no drainage. Pink epithelial tissue noted.   - Localized erythema and edema limited to the digit, extending to digit base - improved  Vascular: DP and PT Pulses Palpable; Foot is Warm to the touch; Capillary Refill Time < 3 Seconds;    Neuro: Protective Sensation Diminished / Moderately Neuropathic   MSK: No pain On Palpation at Wound Site     XR LEFT FOOT   FINDINGS/  IMPRESSION:  No definite acute displaced fracture or dislocation. Tarsal/metatarsal alignment appears maintained. Stable postoperative changes of the great toe and metatarsals. Osteopenia and degenerative changes without difference    Assessment:  Left Foot 2nd Digit Ulceration, Infected     Plan:  Chart reviewed and Patient evaluated. All Questions and Concerns Addressed and Answered  XR Imaging LEFT Foot; Reviewed Results  Weight Bearing Status; WBAT   MRI LEFT foot pending  Arterial duplex: normal flow  Possible surgical intervention pending MRI results.  Discussed in length possible options for treatment pending MRI results: Surgical intervention of amputation of the distal phalanx vs PO or IV abx.  Patient understands and agrees to the plan. Will update with MRI findings.  Patient seen and evaluated w/ Dr. Angulo, DPM      Podiatry   Please message on teams for further questions

## 2025-02-11 NOTE — CONSULT NOTE ADULT - SUBJECTIVE AND OBJECTIVE BOX
Podiatry Consult Note    Subjective:  ESDRAS RAMIREZ  Seen Bedside 69y Male  .   Patient is a 69y old  Male who presents with a chief complaint of left second digit toe wound (10 Feb 2025 20:13)    HPI:  69-year-old male with a past medical history of hypertension, diabetes, rheumatoid arthritis sent in by podiatry for left foot wound.  Patient obtained an laceration to his left second toe the beginning of last month and has experienced trouble healing since then. pt denies any other symptoms including fevers, chill, headache, recent illness/travel, cough, abdominal pain, chest pain, or SOB.    ED vitals: 124/72 HR: 77 RR: 18 T:98 SpO2: 99%    Labs: ESR 18 / wbc 9.49 / lactate 2.4     Xray of foot:  Arterial LE duplex:     s/p zosyn, 1L NS bolus (10 Feb 2025 20:13)      Past Medical History and Surgical History  PAST MEDICAL & SURGICAL HISTORY:       Review of Systems:  [X] Ten point review of systems is otherwise negative except as noted     Objective:  Vital Signs Last 24 Hrs  T(C): 36.7 (10 Feb 2025 17:20), Max: 36.7 (10 Feb 2025 13:49)  T(F): 98 (10 Feb 2025 17:20), Max: 98 (10 Feb 2025 13:49)  HR: 67 (10 Feb 2025 17:20) (67 - 77)  BP: 134/72 (10 Feb 2025 17:20) (124/72 - 134/72)  BP(mean): 93 (10 Feb 2025 17:20) (93 - 93)  RR: 18 (10 Feb 2025 17:20) (18 - 18)  SpO2: 100% (10 Feb 2025 17:20) (99% - 100%)    Parameters below as of 10 Feb 2025 17:20  Patient On (Oxygen Delivery Method): room air                            12.5   9.49  )-----------( 280      ( 10 Feb 2025 15:34 )             36.6                 02-10    137  |  101  |  18  ----------------------------<  95  4.5   |  20  |  1.0    Ca    9.2      10 Feb 2025 15:34    TPro  6.4  /  Alb  4.2  /  TBili  0.3  /  DBili  x   /  AST  19  /  ALT  13  /  AlkPhos  75  02-10        Physical Exam - Lower Extremity Focused:   Derm:   Left Foot - 2nd digit open wound to distal aspect of toe, no purulence, no malodor, no drainage   - Localized erythema and edema limited to the digit, extending to digit base   - wound base granular with fibriotic changes noted, hyperkeratotic wound edges   Vascular: DP and PT Pulses Palpable; Foot is Warm to the touch; Capillary Refill Time < 3 Seconds;    Neuro: Protective Sensation Diminished / Moderately Neuropathic   MSK: No pain On Palpation at Wound Site     XR LEFT FOOT   FINDINGS/  IMPRESSION:  No definite acute displaced fracture or dislocation. Tarsal/metatarsal alignment appears maintained. Stable postoperative changes of the great toe and metatarsals. Osteopenia and degenerative changes without difference    Assessment:  Left Foot 2nd Digit Ulceration, Infected     Plan:  Chart reviewed and Patient evaluated. All Questions and Concerns Addressed and Answered  XR Imaging LEFT Foot; Reviewed Results  Weight Bearing Status; WBAT   Recommend MRI LEFT foot to r/o OM   Please order ESR, CRP, A1C, Coags, Lactate  Appreciate ID consult for IV ABX recommendations   Patient admitted to Medicine   Podiatry to follow inpatient - possible plans for Sx vs PICC line pending MRI results   Discussed Plan w/ LISSY FoleyM    Podiatry   Please message on teams for any further questions 
  ESDRAS RAMIREZ  69y, Male  Allergy: No Known Allergies      All historical available data reviewed.    HPI:  69-year-old male with a past medical history of hypertension, diabetes, rheumatoid arthritis sent in by podiatry for left foot wound.  Patient obtained an laceration to his left second toe the beginning of last month and has experienced trouble healing since then. pt denies any other symptoms including fevers, chill, headache, recent illness/travel, cough, abdominal pain, chest pain, or SOB.    ED vitals: 124/72 HR: 77 RR: 18 T:98 SpO2: 99%    Labs: ESR 18 / wbc 9.49 / lactate 2.4     Xray of foot:  No definite acute displaced fracture or dislocation. Tarsal/metatarsal   alignment appears maintained. Stable postoperative changes of the great   toe and metatarsals. Osteopenia and degenerative changes without   difference    Arterial LE duplex: Pending    s/p zosyn, 1L NS bolus (10 Feb 2025 20:13)    FAMILY HISTORY:    PAST MEDICAL & SURGICAL HISTORY:    INFECTIOUS DISEASE HISTORY.  ID consulted for ABx recommendations.  Prior hospital charts reviewed.   Primary team notes reviewed.   Other consultant notes reviewed.   Prior cultures noted   Hx obtained independently from his wife at the bedside        VITALS:  T(F): 97.8, Max: 98 (02-10-25 @ 13:49)  HR: 68  BP: 118/54  RR: 18Vital Signs Last 24 Hrs  T(C): 36.6 (11 Feb 2025 05:56), Max: 36.7 (10 Feb 2025 13:49)  T(F): 97.8 (11 Feb 2025 05:56), Max: 98 (10 Feb 2025 13:49)  HR: 68 (11 Feb 2025 05:56) (67 - 77)  BP: 118/54 (11 Feb 2025 05:56) (118/54 - 134/72)  BP(mean): 75 (11 Feb 2025 05:56) (75 - 93)  RR: 18 (11 Feb 2025 05:56) (18 - 18)  SpO2: 97% (11 Feb 2025 05:56) (97% - 100%)    Parameters below as of 11 Feb 2025 05:56  Patient On (Oxygen Delivery Method): room air        TESTS & MEASUREMENTS:                        11.5   7.53  )-----------( 273      ( 11 Feb 2025 06:48 )             33.4     02-11    139  |  103  |  17  ----------------------------<  103[H]  3.8   |  25  |  1.0    Ca    9.1      11 Feb 2025 06:48    TPro  5.9[L]  /  Alb  3.7  /  TBili  0.3  /  DBili  x   /  AST  12  /  ALT  11  /  AlkPhos  65  02-11    LIVER FUNCTIONS - ( 11 Feb 2025 06:48 )  Alb: 3.7 g/dL / Pro: 5.9 g/dL / ALK PHOS: 65 U/L / ALT: 11 U/L / AST: 12 U/L / GGT: x             Urinalysis Basic - ( 11 Feb 2025 06:48 )    Color: x / Appearance: x / SG: x / pH: x  Gluc: 103 mg/dL / Ketone: x  / Bili: x / Urobili: x   Blood: x / Protein: x / Nitrite: x   Leuk Esterase: x / RBC: x / WBC x   Sq Epi: x / Non Sq Epi: x / Bacteria: x          RADIOLOGY & ADDITIONAL TESTS:  Personal review of radiological diagnostics performed  Echo and EKG results noted when applicable.     MEDICATIONS:  acetaminophen     Tablet .. 650 milliGRAM(s) Oral every 6 hours PRN  amLODIPine   Tablet 10 milliGRAM(s) Oral daily  buPROPion XL (24-Hour) . 150 milliGRAM(s) Oral daily  dextrose 5%. 1000 milliLiter(s) IV Continuous <Continuous>  dextrose 5%. 1000 milliLiter(s) IV Continuous <Continuous>  dextrose 50% Injectable 25 Gram(s) IV Push once  dextrose 50% Injectable 12.5 Gram(s) IV Push once  dextrose 50% Injectable 25 Gram(s) IV Push once  dextrose Oral Gel 15 Gram(s) Oral once PRN  enalapril 30 milliGRAM(s) Oral daily  glucagon  Injectable 1 milliGRAM(s) IntraMuscular once  hydrochlorothiazide 12.5 milliGRAM(s) Oral daily  insulin lispro (ADMELOG) corrective regimen sliding scale   SubCutaneous three times a day before meals  LORazepam     Tablet 1 milliGRAM(s) Oral daily PRN  melatonin 5 milliGRAM(s) Oral at bedtime  oxycodone    5 mG/acetaminophen 325 mG 1 Tablet(s) Oral every 6 hours PRN  piperacillin/tazobactam IVPB.. 3.375 Gram(s) IV Intermittent every 8 hours      ANTIBIOTICS:  piperacillin/tazobactam IVPB.. 3.375 Gram(s) IV Intermittent every 8 hours

## 2025-02-12 LAB
ALBUMIN SERPL ELPH-MCNC: 4.2 G/DL — SIGNIFICANT CHANGE UP (ref 3.5–5.2)
ALP SERPL-CCNC: 67 U/L — SIGNIFICANT CHANGE UP (ref 30–115)
ALT FLD-CCNC: 12 U/L — SIGNIFICANT CHANGE UP (ref 0–41)
ANION GAP SERPL CALC-SCNC: 12 MMOL/L — SIGNIFICANT CHANGE UP (ref 7–14)
AST SERPL-CCNC: 13 U/L — SIGNIFICANT CHANGE UP (ref 0–41)
BASOPHILS # BLD AUTO: 0.09 K/UL — SIGNIFICANT CHANGE UP (ref 0–0.2)
BASOPHILS NFR BLD AUTO: 1.1 % — HIGH (ref 0–1)
BILIRUB SERPL-MCNC: 0.2 MG/DL — SIGNIFICANT CHANGE UP (ref 0.2–1.2)
BUN SERPL-MCNC: 13 MG/DL — SIGNIFICANT CHANGE UP (ref 10–20)
CALCIUM SERPL-MCNC: 8.9 MG/DL — SIGNIFICANT CHANGE UP (ref 8.4–10.5)
CHLORIDE SERPL-SCNC: 105 MMOL/L — SIGNIFICANT CHANGE UP (ref 98–110)
CO2 SERPL-SCNC: 25 MMOL/L — SIGNIFICANT CHANGE UP (ref 17–32)
CREAT SERPL-MCNC: 0.8 MG/DL — SIGNIFICANT CHANGE UP (ref 0.7–1.5)
EGFR: 96 ML/MIN/1.73M2 — SIGNIFICANT CHANGE UP
EOSINOPHIL # BLD AUTO: 0.4 K/UL — SIGNIFICANT CHANGE UP (ref 0–0.7)
EOSINOPHIL NFR BLD AUTO: 5 % — SIGNIFICANT CHANGE UP (ref 0–8)
GLUCOSE BLDC GLUCOMTR-MCNC: 115 MG/DL — HIGH (ref 70–99)
GLUCOSE BLDC GLUCOMTR-MCNC: 127 MG/DL — HIGH (ref 70–99)
GLUCOSE BLDC GLUCOMTR-MCNC: 137 MG/DL — HIGH (ref 70–99)
GLUCOSE BLDC GLUCOMTR-MCNC: 181 MG/DL — HIGH (ref 70–99)
GLUCOSE SERPL-MCNC: 119 MG/DL — HIGH (ref 70–99)
HCT VFR BLD CALC: 36.8 % — LOW (ref 42–52)
HGB BLD-MCNC: 12.7 G/DL — LOW (ref 14–18)
IMM GRANULOCYTES NFR BLD AUTO: 0.2 % — SIGNIFICANT CHANGE UP (ref 0.1–0.3)
LYMPHOCYTES # BLD AUTO: 1.89 K/UL — SIGNIFICANT CHANGE UP (ref 1.2–3.4)
LYMPHOCYTES # BLD AUTO: 23.5 % — SIGNIFICANT CHANGE UP (ref 20.5–51.1)
MCHC RBC-ENTMCNC: 30 PG — SIGNIFICANT CHANGE UP (ref 27–31)
MCHC RBC-ENTMCNC: 34.5 G/DL — SIGNIFICANT CHANGE UP (ref 32–37)
MCV RBC AUTO: 86.8 FL — SIGNIFICANT CHANGE UP (ref 80–94)
MONOCYTES # BLD AUTO: 0.86 K/UL — HIGH (ref 0.1–0.6)
MONOCYTES NFR BLD AUTO: 10.7 % — HIGH (ref 1.7–9.3)
NEUTROPHILS # BLD AUTO: 4.79 K/UL — SIGNIFICANT CHANGE UP (ref 1.4–6.5)
NEUTROPHILS NFR BLD AUTO: 59.5 % — SIGNIFICANT CHANGE UP (ref 42.2–75.2)
NRBC BLD AUTO-RTO: 0 /100 WBCS — SIGNIFICANT CHANGE UP (ref 0–0)
PLATELET # BLD AUTO: 279 K/UL — SIGNIFICANT CHANGE UP (ref 130–400)
PMV BLD: 10.5 FL — HIGH (ref 7.4–10.4)
POTASSIUM SERPL-MCNC: 4 MMOL/L — SIGNIFICANT CHANGE UP (ref 3.5–5)
POTASSIUM SERPL-SCNC: 4 MMOL/L — SIGNIFICANT CHANGE UP (ref 3.5–5)
PROT SERPL-MCNC: 6.3 G/DL — SIGNIFICANT CHANGE UP (ref 6–8)
RBC # BLD: 4.24 M/UL — LOW (ref 4.7–6.1)
RBC # FLD: 13.2 % — SIGNIFICANT CHANGE UP (ref 11.5–14.5)
SODIUM SERPL-SCNC: 142 MMOL/L — SIGNIFICANT CHANGE UP (ref 135–146)
WBC # BLD: 8.05 K/UL — SIGNIFICANT CHANGE UP (ref 4.8–10.8)
WBC # FLD AUTO: 8.05 K/UL — SIGNIFICANT CHANGE UP (ref 4.8–10.8)

## 2025-02-12 PROCEDURE — 99233 SBSQ HOSP IP/OBS HIGH 50: CPT

## 2025-02-12 RX ADMIN — AMPICILLIN SODIUM AND SULBACTAM SODIUM 200 GRAM(S): 2; 1 INJECTION, POWDER, FOR SOLUTION INTRAMUSCULAR; INTRAVENOUS at 23:10

## 2025-02-12 RX ADMIN — ANTISEPTIC SURGICAL SCRUB 1 APPLICATION(S): 0.04 SOLUTION TOPICAL at 05:45

## 2025-02-12 RX ADMIN — AMPICILLIN SODIUM AND SULBACTAM SODIUM 200 GRAM(S): 2; 1 INJECTION, POWDER, FOR SOLUTION INTRAMUSCULAR; INTRAVENOUS at 17:43

## 2025-02-12 RX ADMIN — Medication 2: at 11:49

## 2025-02-12 RX ADMIN — AMPICILLIN SODIUM AND SULBACTAM SODIUM 200 GRAM(S): 2; 1 INJECTION, POWDER, FOR SOLUTION INTRAMUSCULAR; INTRAVENOUS at 00:55

## 2025-02-12 RX ADMIN — Medication 10 MILLIGRAM(S): at 05:42

## 2025-02-12 RX ADMIN — Medication 30 MILLIGRAM(S): at 05:41

## 2025-02-12 RX ADMIN — AMPICILLIN SODIUM AND SULBACTAM SODIUM 200 GRAM(S): 2; 1 INJECTION, POWDER, FOR SOLUTION INTRAMUSCULAR; INTRAVENOUS at 11:50

## 2025-02-12 RX ADMIN — AMPICILLIN SODIUM AND SULBACTAM SODIUM 200 GRAM(S): 2; 1 INJECTION, POWDER, FOR SOLUTION INTRAMUSCULAR; INTRAVENOUS at 05:45

## 2025-02-12 RX ADMIN — ACETAMINOPHEN, DIPHENHYDRAMINE HCL, PHENYLEPHRINE HCL 5 MILLIGRAM(S): 325; 25; 5 TABLET ORAL at 22:32

## 2025-02-12 RX ADMIN — BUPROPION HYDROCHLORIDE 150 MILLIGRAM(S): 150 TABLET, EXTENDED RELEASE ORAL at 11:50

## 2025-02-12 RX ADMIN — ENOXAPARIN SODIUM 40 MILLIGRAM(S): 100 INJECTION SUBCUTANEOUS at 17:43

## 2025-02-12 NOTE — PROGRESS NOTE ADULT - ASSESSMENT
69-year-old male with a past medical history of hypertension, diabetes, rheumatoid arthritis sent in by podiatry for left foot wound.  Patient obtained an laceration to his left second toe the beginning of last month and has experienced trouble healing since then.    #L foot second digit toe wound   - r/o osteomyelitis vs superficial infection  - HDS, afebrile  - no leukocytosis  - LABS: ESR 18, CRP 3.4   - Foot xray: No definite acute displaced fracture or dislocation. Tarsal/metatarsal alignment appears maintained. Stable postoperative changes of the great toe and metatarsals. Osteopenia and degenerative changes without difference  - Podiatry saw in ED: recommended MRI L foot for osteo for possible sx vs long term abx  -  MRI left foot: ulcer at the 2nd distal, middle and proximal phalanges. osteo of the 2nd distal and middle phalanges and likely distal half of the second proximal phalynx  -  [facility] arterial duplex: Normal arterial flow in the left lower extremity.  - ID: Unasyn 3 gm IV q6   - follow up with podiatry    #HTN  - c/w amLODIPine   Tablet 10 milliGRAM(s) Oral daily  - c/w enalapril 30 milliGRAM(s) Oral daily  - c/w hydrochlorothiazide 12.5 milliGRAM(s) Oral daily    #Diabetes  - A1C 6.4%  - ISS  - metformin and Ozempic at home    #RA  - used to take Enbrel but stopped taking a month ago for concerns of side effects    #depression/ anxiety  - buPROPion XL (24-Hour) . 150 milliGRAM(s) Oral daily    DVT:    PPI:  Diet: regular    #Progress Note Handoff  Pending (specify):  podiatry follow up, ID follow up, PT eval  Family discussion: updated  Disposition:  Unknown at this time________    #Progress Note Handoff  Pending (specify):  MRI, podiatry follow up, ID follow up  Disposition: Unknown at this time________ .   69-year-old male with a past medical history of hypertension, diabetes, rheumatoid arthritis sent in by podiatry for left foot wound.  Patient obtained an laceration to his left second toe the beginning of last month and has experienced trouble healing since then.    #L foot second digit toe wound   - r/o osteomyelitis vs superficial infection  - HDS, afebrile  - no leukocytosis  - LABS: ESR 18, CRP 3.4   - Foot xray: No definite acute displaced fracture or dislocation. Tarsal/metatarsal alignment appears maintained. Stable postoperative changes of the great toe and metatarsals. Osteopenia and degenerative changes without difference  - Podiatry saw in ED: recommended MRI L foot for osteo for possible sx vs long term abx  -  MRI left foot: ulcer at the 2nd distal, middle and proximal phalanges. osteo of the 2nd distal and middle phalanges and likely distal half of the second proximal phalynx  -  [facility] arterial duplex: Normal arterial flow in the left lower extremity.  - ID: Unasyn 3 gm IV q6   - follow up with podiatry: plan for OR tomorrow    # Medical risk stratification  - currrently vitals are stable and WNL  - labs grossly WNL  - METs at baseline >4  - RCRI score 1->6% risk of major cardiac event  - considered low risk for a low risk procedure  - this does not clear the patient to go to the OR for surgery but rather provides the surgical team a risk stratification to assist weigh the risk vs benefit of proceeding forward with surgical intervention  #HTN  - c/w amLODIPine   Tablet 10 milliGRAM(s) Oral daily  - c/w enalapril 30 milliGRAM(s) Oral daily  - c/w hydrochlorothiazide 12.5 milliGRAM(s) Oral daily    #Diabetes  - A1C 6.4%  - ISS  - metformin and Ozempic at home    #RA  - used to take Enbrel but stopped taking a month ago for concerns of side effects    #depression/ anxiety  - buPROPion XL (24-Hour) . 150 milliGRAM(s) Oral daily    DVT:    PPI:  Diet: regular    #Progress Note Handoff  Pending (specify):  podiatry follow up, ID follow up, PT eval  Family discussion: updated  Disposition:  Unknown at this time________

## 2025-02-12 NOTE — CHART NOTE - NSCHARTNOTEFT_GEN_A_CORE
Patient to go to OR tomorrow 2/13 with Podiatry   - Please have patient NPO MN  - Please order labs - CBC, BMp, Coags, T&S  - [facility] labs for OR   - Please allow for any medical clearance    Thank you   [phone] - please reach out via teams for any further questions

## 2025-02-12 NOTE — PROGRESS NOTE ADULT - SUBJECTIVE AND OBJECTIVE BOX
ESDRAS RAMIREZ  69y  Male      Patient is a 69y old  Male who presents with a chief complaint of left second digit toe wound (11 Feb 2025 09:54)      INTERVAL HPI/OVERNIGHT EVENTS: no acute events overnight. no major complaints. no pain. good po intake. voiding urine spontaneously. BMs +         T(C): 36.7 (02-12-25 @ 04:30), Max: 36.7 (02-12-25 @ 04:30)  HR: 65 (02-12-25 @ 04:30) (65 - 69)  BP: 147/77 (02-12-25 @ 04:30) (120/64 - 147/77)  RR: 18 (02-12-25 @ 04:30) (18 - 18)  SpO2: 98% (02-12-25 @ 04:30) (98% - 98%)  Wt(kg): --Vital Signs Last 24 Hrs  T(C): 36.7 (12 Feb 2025 04:30), Max: 36.7 (12 Feb 2025 04:30)  T(F): 98 (12 Feb 2025 04:30), Max: 98 (12 Feb 2025 04:30)  HR: 65 (12 Feb 2025 04:30) (65 - 69)  BP: 147/77 (12 Feb 2025 04:30) (120/64 - 147/77)  BP(mean): 100 (12 Feb 2025 04:30) (83 - 100)  RR: 18 (12 Feb 2025 04:30) (18 - 18)  SpO2: 98% (12 Feb 2025 04:30) (98% - 98%)    Parameters below as of 12 Feb 2025 04:30  Patient On (Oxygen Delivery Method): room air      PHYSICAL EXAM:  GENERAL: elderly M, NAD, non toxic appearing  EYES: anicteric sclera, non injected conjunctiva, EOMI  PSYCH: no agitation, baseline mentation  NERVOUS SYSTEM:  Alert & Oriented X3   PULMONARY: symmetrical chest rise, no accessory muscle use  CARDIOVASCULAR: non tachycardic  EXTREMITIES: LLE wrapped in bandages  SKIN: No rashes or lesions    Consultant(s) Notes Reviewed:  [x ] YES  [ ] NO    Discussed with Consultants/Other Providers [ x] YES     LABS                          12.7   8.05  )-----------( 279      ( 12 Feb 2025 06:50 )             36.8     02-12    142  |  105  |  13  ----------------------------<  119[H]  4.0   |  25  |  0.8    Ca    8.9      12 Feb 2025 06:50    TPro  6.3  /  Alb  4.2  /  TBili  0.2  /  DBili  x   /  AST  13  /  ALT  12  /  AlkPhos  67  02-12      Urinalysis Basic - ( 12 Feb 2025 06:50 )    Color: x / Appearance: x / SG: x / pH: x  Gluc: 119 mg/dL / Ketone: x  / Bili: x / Urobili: x   Blood: x / Protein: x / Nitrite: x   Leuk Esterase: x / RBC: x / WBC x   Sq Epi: x / Non Sq Epi: x / Bacteria: x      PT/INR - ( 11 Feb 2025 06:48 )   PT: 11.80 sec;   INR: 1.00 ratio         PTT - ( 10 Feb 2025 16:25 )  PTT:33.7 sec  Lactate Trend  02-10 @ 19:36 Lactate:2.7   02-10 @ 16:25 Lactate:2.4       POCT Blood Glucose.: 181 mg/dL (12 Feb 2025 11:30)    RADIOLOGY & ADDITIONAL TESTS:  MR Foot w/ IV Cont, Left (02.11.25 @ 15:50) >  Ulcer at the second toe tip, with subcutaneous edema throughout the toe   and area of punctate air foci.    Fusion of the second distal, middle and proximal phalanges.    Osteomyelitis involving the second distal and middle phalanges, and   likely the distal half of the second proximal phalanx, as above.      Imaging Personally Reviewed:  [ ] YES  [x ] NO    HEALTH ISSUES - PROBLEM Dx:      MEDICATIONS  (STANDING):  amLODIPine   Tablet 10 milliGRAM(s) Oral daily  ampicillin/sulbactam  IVPB 3 Gram(s) IV Intermittent every 6 hours  ampicillin/sulbactam  IVPB      buPROPion XL (24-Hour) . 150 milliGRAM(s) Oral daily  chlorhexidine 2% Cloths 1 Application(s) Topical <User Schedule>  dextrose 5%. 1000 milliLiter(s) (100 mL/Hr) IV Continuous <Continuous>  dextrose 5%. 1000 milliLiter(s) (50 mL/Hr) IV Continuous <Continuous>  dextrose 50% Injectable 25 Gram(s) IV Push once  dextrose 50% Injectable 12.5 Gram(s) IV Push once  dextrose 50% Injectable 25 Gram(s) IV Push once  enalapril 30 milliGRAM(s) Oral daily  enoxaparin Injectable 40 milliGRAM(s) SubCutaneous every 24 hours  glucagon  Injectable 1 milliGRAM(s) IntraMuscular once  hydrochlorothiazide 12.5 milliGRAM(s) Oral daily  insulin lispro (ADMELOG) corrective regimen sliding scale   SubCutaneous three times a day before meals  melatonin 5 milliGRAM(s) Oral at bedtime    MEDICATIONS  (PRN):  acetaminophen     Tablet .. 650 milliGRAM(s) Oral every 6 hours PRN Temp greater or equal to 38C (100.4F), Mild Pain (1 - 3)  dextrose Oral Gel 15 Gram(s) Oral once PRN Blood Glucose LESS THAN 70 milliGRAM(s)/deciliter  LORazepam     Tablet 1 milliGRAM(s) Oral daily PRN Anxiety  oxycodone    5 mG/acetaminophen 325 mG 1 Tablet(s) Oral every 6 hours PRN Severe Pain (7 - 10)

## 2025-02-12 NOTE — PROGRESS NOTE ADULT - ATTENDING COMMENTS
charting, resident teaching rounds, and interdisciplinary planning.
***My note supersedes any discrepancies that may be above in the resident's note***    69-year-old male with a past medical history of hypertension, diabetes, rheumatoid arthritis sent in by podiatry for left foot wound.  Patient obtained an laceration to his left second toe the beginning of last month and has experienced trouble healing since then.    #L foot second digit toe wound   - r/o osteomyelitis vs superficial infection  - HDS, afebrile  - no leukocytosis  - LABS: ESR 18, CRP 3.4   - Foot xray: No definite acute displaced fracture or dislocation. Tarsal/metatarsal alignment appears maintained. Stable postoperative changes of the great toe and metatarsals. Osteopenia and degenerative changes without difference  - Podiatry saw in ED: recommended MRI L foot for osteo for possible sx vs long term abx  - F/u MRI left foot  - f/u [facility] arterial duplex  - ID: Unasyn 3 gm IV q6     #HTN  - c/w home meds     #Diabetes  - A1C 6.4%  - ISS  - metformin and Ozempic at home    #RA  - used to take Enbrel but stopped taking a month ago for concerns of side effects    #depression/ anxiety  - takes buproprion  and pristique at home    DVT:    PPI:  Diet: regular  dispo: pending MRI       #Progress Note Handoff  Pending (specify):  MRI, podiatry follow up, ID follow up  Disposition: Unknown at this time________

## 2025-02-12 NOTE — MEDICAL STUDENT PROGRESS NOTE(EDUCATION) - SUBJECTIVE AND OBJECTIVE BOX
SUBJECTIVE/OVERNIGHT EVENTS  Today is hospital day 2d. This morning patient was seen and examined at bedside, resting comfortably in bed. No acute or major events overnight. Per patient, any pain experienced at this time is due to RA.    MEDICATIONS  STANDING MEDICATIONS  amLODIPine   Tablet 10 milliGRAM(s) Oral daily  ampicillin/sulbactam  IVPB 3 Gram(s) IV Intermittent every 6 hours  ampicillin/sulbactam  IVPB      buPROPion XL (24-Hour) . 150 milliGRAM(s) Oral daily  chlorhexidine 2% Cloths 1 Application(s) Topical <User Schedule>  dextrose 5%. 1000 milliLiter(s) IV Continuous <Continuous>  dextrose 5%. 1000 milliLiter(s) IV Continuous <Continuous>  dextrose 50% Injectable 25 Gram(s) IV Push once  dextrose 50% Injectable 12.5 Gram(s) IV Push once  dextrose 50% Injectable 25 Gram(s) IV Push once  enalapril 30 milliGRAM(s) Oral daily  enoxaparin Injectable 40 milliGRAM(s) SubCutaneous every 24 hours  glucagon  Injectable 1 milliGRAM(s) IntraMuscular once  hydrochlorothiazide 12.5 milliGRAM(s) Oral daily  insulin lispro (ADMELOG) corrective regimen sliding scale   SubCutaneous three times a day before meals  melatonin 5 milliGRAM(s) Oral at bedtime    PRN MEDICATIONS  acetaminophen     Tablet .. 650 milliGRAM(s) Oral every 6 hours PRN  dextrose Oral Gel 15 Gram(s) Oral once PRN  LORazepam     Tablet 1 milliGRAM(s) Oral daily PRN  oxycodone    5 mG/acetaminophen 325 mG 1 Tablet(s) Oral every 6 hours PRN    VITALS  T(F): 98 (02-12-25 @ 04:30), Max: 98 (02-12-25 @ 04:30)  HR: 65 (02-12-25 @ 04:30) (65 - 69)  BP: 147/77 (02-12-25 @ 04:30) (120/64 - 147/77)  RR: 18 (02-12-25 @ 04:30) (18 - 18)  SpO2: 98% (02-12-25 @ 04:30) (98% - 98%)  POCT Blood Glucose.: 137 mg/dL (02-12-25 @ 07:23)  POCT Blood Glucose.: 126 mg/dL (02-11-25 @ 22:14)  POCT Blood Glucose.: 135 mg/dL (02-11-25 @ 16:43)  POCT Blood Glucose.: 133 mg/dL (02-11-25 @ 11:39)    PHYSICAL EXAM    GENERAL: NAD, lying in bed comfortably  HEAD:  Atraumatic, normocephalic  EYES: EOMI, PERRLA, conjunctiva and sclera clear  ENT: Moist mucous membranes  NECK: Supple, no JVD  HEART: Regular rate and rhythm, no murmurs, rubs, or gallops  LUNGS: Unlabored respirations.  Clear to auscultation bilaterally, no crackles, wheezing, or rhonchi  ABDOMEN: Soft, nontender, nondistended, +BS  EXTREMITIES: 2+ peripheral pulses bilaterally. No clubbing, cyanosis, or edema  NERVOUS SYSTEM:  A&Ox3, no focal deficits   SKIN: No rashes or lesions    LABS             12.7   8.05  )-----------( 279      ( 02-12-25 @ 06:50 )             36.8     142  |  105  |  13  -------------------------<  119   02-12-25 @ 06:50  4.0  |  25  |  0.8    Ca      8.9     02-12-25 @ 06:50    TPro  6.3  /  Alb  4.2  /  TBili  0.2  /  DBili  x   /  AST  13  /  ALT  12  /  AlkPhos  67  /  GGT  x     02-12-25 @ 06:50    PT/INR - ( 02-11-25 @ 06:48 )   PT: 11.80 sec;   INR: 1.00 ratio  PTT - ( 02-10-25 @ 16:25 )  PTT:33.7 sec      Urinalysis Basic - ( 12 Feb 2025 06:50 )    Color: x / Appearance: x / SG: x / pH: x  Gluc: 119 mg/dL / Ketone: x  / Bili: x / Urobili: x   Blood: x / Protein: x / Nitrite: x   Leuk Esterase: x / RBC: x / WBC x   Sq Epi: x / Non Sq Epi: x / Bacteria: x          Culture - Blood (collected 10 Feb 2025 15:34)  Source: .Blood BLOOD  Preliminary Report (11 Feb 2025 23:01):    No growth at 24 hours    Culture - Blood (collected 10 Feb 2025 15:34)  Source: .Blood BLOOD  Preliminary Report (11 Feb 2025 23:01):    No growth at 24 hours      IMAGING    ASSESSMENT  69 year old male with past medical history of hypertension, diabetes, rheumatoid arthritis sent in by podiatry for left foot wound. Patient obtained a laceration to his second toe at the beginning of last month. Per patient he was unaware of incident until he noticed it was not healing properly. Pt was admitted to rule out osteomyelitis, MRI completed on 2/11 revealed subcutaneous edema throughout the second toe with punctuate air foci, confluent T1 hypointense marrow signal abnormality throughout the second distal phalanx and middle phalanx consistent with osteomyelitis.    #L foot second digit toe wound r/o osteomyelitis vs superficial infection  -Foot MRI: subcutaneous edema throughout the second toe with punctuate air foci, confluent T1 hypointense marrow signal abnormality throughout the second distal phalanx and middle phalanx consistent with osteomyelitis  -Foot arterial duplex: normal flow  -Foot xray: no definite acute displaced fracture or dislocation. Tarsal/metatarsal alignment appears maintained. Stable postoperative changes of the great toe and metatarsals. Osteopenia and degenerative changes without difference  -Podiatry saw in ED and consulted inpatient  -LABS: ESR 18, CRP 3.4  -continue unasyn 3 mg IV q6    #HTN  -c/w home meds    #Diabetes  -A1c 6.4%  -ISS  -metformin and Ozempic at home    #RA  - discontinued Enbrel 1 mo ago due to side effects of poor wound healing    #depression/anxiety  -home meds: bupropion and pristique  - continue bupropion    Diet: regular     SUBJECTIVE/OVERNIGHT EVENTS  Today is hospital day 2d. This morning patient was seen and examined at bedside, resting comfortably in bed. No acute or major events overnight. Per patient, any pain experienced at this time is due to RA.    MEDICATIONS  STANDING MEDICATIONS  amLODIPine   Tablet 10 milliGRAM(s) Oral daily  ampicillin/sulbactam  IVPB 3 Gram(s) IV Intermittent every 6 hours  ampicillin/sulbactam  IVPB      buPROPion XL (24-Hour) . 150 milliGRAM(s) Oral daily  chlorhexidine 2% Cloths 1 Application(s) Topical <User Schedule>  dextrose 5%. 1000 milliLiter(s) IV Continuous <Continuous>  dextrose 5%. 1000 milliLiter(s) IV Continuous <Continuous>  dextrose 50% Injectable 25 Gram(s) IV Push once  dextrose 50% Injectable 12.5 Gram(s) IV Push once  dextrose 50% Injectable 25 Gram(s) IV Push once  enalapril 30 milliGRAM(s) Oral daily  enoxaparin Injectable 40 milliGRAM(s) SubCutaneous every 24 hours  glucagon  Injectable 1 milliGRAM(s) IntraMuscular once  hydrochlorothiazide 12.5 milliGRAM(s) Oral daily  insulin lispro (ADMELOG) corrective regimen sliding scale   SubCutaneous three times a day before meals  melatonin 5 milliGRAM(s) Oral at bedtime    PRN MEDICATIONS  acetaminophen     Tablet .. 650 milliGRAM(s) Oral every 6 hours PRN  dextrose Oral Gel 15 Gram(s) Oral once PRN  LORazepam     Tablet 1 milliGRAM(s) Oral daily PRN  oxycodone    5 mG/acetaminophen 325 mG 1 Tablet(s) Oral every 6 hours PRN    VITALS  T(F): 98 (02-12-25 @ 04:30), Max: 98 (02-12-25 @ 04:30)  HR: 65 (02-12-25 @ 04:30) (65 - 69)  BP: 147/77 (02-12-25 @ 04:30) (120/64 - 147/77)  RR: 18 (02-12-25 @ 04:30) (18 - 18)  SpO2: 98% (02-12-25 @ 04:30) (98% - 98%)  POCT Blood Glucose.: 137 mg/dL (02-12-25 @ 07:23)  POCT Blood Glucose.: 126 mg/dL (02-11-25 @ 22:14)  POCT Blood Glucose.: 135 mg/dL (02-11-25 @ 16:43)  POCT Blood Glucose.: 133 mg/dL (02-11-25 @ 11:39)    PHYSICAL EXAM    GENERAL: NAD, lying in bed comfortably  HEAD:  Atraumatic, normocephalic  EYES: EOMI, PERRLA, conjunctiva and sclera clear  ENT: Moist mucous membranes  NECK: Supple, no JVD  HEART: Regular rate and rhythm, no murmurs, rubs, or gallops  LUNGS: Unlabored respirations.  Clear to auscultation bilaterally, no crackles, wheezing, or rhonchi  ABDOMEN: Soft, nontender, nondistended, +BS  EXTREMITIES: 2+ peripheral pulses bilaterally. No clubbing, cyanosis, or edema  NERVOUS SYSTEM:  A&Ox3, no focal deficits   SKIN: No rashes or lesions    LABS             12.7   8.05  )-----------( 279      ( 02-12-25 @ 06:50 )             36.8     142  |  105  |  13  -------------------------<  119   02-12-25 @ 06:50  4.0  |  25  |  0.8    Ca      8.9     02-12-25 @ 06:50    TPro  6.3  /  Alb  4.2  /  TBili  0.2  /  DBili  x   /  AST  13  /  ALT  12  /  AlkPhos  67  /  GGT  x     02-12-25 @ 06:50    PT/INR - ( 02-11-25 @ 06:48 )   PT: 11.80 sec;   INR: 1.00 ratio  PTT - ( 02-10-25 @ 16:25 )  PTT:33.7 sec      Urinalysis Basic - ( 12 Feb 2025 06:50 )    Color: x / Appearance: x / SG: x / pH: x  Gluc: 119 mg/dL / Ketone: x  / Bili: x / Urobili: x   Blood: x / Protein: x / Nitrite: x   Leuk Esterase: x / RBC: x / WBC x   Sq Epi: x / Non Sq Epi: x / Bacteria: x          Culture - Blood (collected 10 Feb 2025 15:34)  Source: .Blood BLOOD  Preliminary Report (11 Feb 2025 23:01):    No growth at 24 hours    Culture - Blood (collected 10 Feb 2025 15:34)  Source: .Blood BLOOD  Preliminary Report (11 Feb 2025 23:01):    No growth at 24 hours      IMAGING    ASSESSMENT  69 year old male with past medical history of hypertension, diabetes, rheumatoid arthritis sent in by podiatry for left foot wound. Patient obtained a laceration to his second toe at the beginning of last month. Per patient he was unaware of incident until he noticed it was not healing properly. Pt was admitted to rule out osteomyelitis, MRI completed on 2/11 revealed subcutaneous edema throughout the second toe with punctuate air foci, confluent T1 hypointense marrow signal abnormality throughout the second distal phalanx and middle phalanx consistent with osteomyelitis.    #L foot second digit toe wound r/o osteomyelitis vs superficial infection  -Foot MRI: subcutaneous edema throughout the second toe with punctuate air foci, confluent T1 hypointense marrow signal abnormality throughout the second distal phalanx and middle phalanx consistent with osteomyelitis  -Foot arterial duplex: normal flow  -Foot xray: no definite acute displaced fracture or dislocation. Tarsal/metatarsal alignment appears maintained. Stable postoperative changes of the great toe and metatarsals. Osteopenia and degenerative changes without difference  -Podiatry saw in ED and consulted inpatient: surgical intervention of amputation of the distal phalanx vs PO or IV abx.  -LABS: ESR 18, CRP 3.4  -continue unasyn 3 mg IV q6    #HTN  -c/w home meds    #Diabetes  -A1c 6.4%  -ISS  -metformin and Ozempic at home    #RA  - discontinued Enbrel 1 mo ago due to side effects of poor wound healing    #depression/anxiety  -home meds: bupropion and pristique  - continue bupropion    Diet: regular

## 2025-02-13 LAB
GLUCOSE BLDC GLUCOMTR-MCNC: 120 MG/DL — HIGH (ref 70–99)
GLUCOSE BLDC GLUCOMTR-MCNC: 125 MG/DL — HIGH (ref 70–99)
GLUCOSE BLDC GLUCOMTR-MCNC: 129 MG/DL — HIGH (ref 70–99)
GLUCOSE BLDC GLUCOMTR-MCNC: 130 MG/DL — HIGH (ref 70–99)
GLUCOSE BLDC GLUCOMTR-MCNC: 209 MG/DL — HIGH (ref 70–99)

## 2025-02-13 PROCEDURE — 99232 SBSQ HOSP IP/OBS MODERATE 35: CPT

## 2025-02-13 PROCEDURE — 73630 X-RAY EXAM OF FOOT: CPT | Mod: 26,LT

## 2025-02-13 PROCEDURE — 88305 TISSUE EXAM BY PATHOLOGIST: CPT | Mod: 26

## 2025-02-13 PROCEDURE — 88311 DECALCIFY TISSUE: CPT | Mod: 26

## 2025-02-13 RX ORDER — AMPICILLIN SODIUM AND SULBACTAM SODIUM 2; 1 G/1; G/1
INJECTION, POWDER, FOR SOLUTION INTRAMUSCULAR; INTRAVENOUS
Refills: 0 | Status: DISCONTINUED | OUTPATIENT
Start: 2025-02-13 | End: 2025-02-13

## 2025-02-13 RX ORDER — BUPROPION HYDROCHLORIDE 150 MG/1
150 TABLET, EXTENDED RELEASE ORAL DAILY
Refills: 0 | Status: DISCONTINUED | OUTPATIENT
Start: 2025-02-13 | End: 2025-02-14

## 2025-02-13 RX ORDER — ENALAPRIL MALEATE 20 MG
30 TABLET ORAL DAILY
Refills: 0 | Status: DISCONTINUED | OUTPATIENT
Start: 2025-02-13 | End: 2025-02-14

## 2025-02-13 RX ORDER — SODIUM CHLORIDE 9 G/ML
1000 INJECTION, SOLUTION INTRAVENOUS
Refills: 0 | Status: DISCONTINUED | OUTPATIENT
Start: 2025-02-13 | End: 2025-02-13

## 2025-02-13 RX ORDER — ACETAMINOPHEN, DIPHENHYDRAMINE HCL, PHENYLEPHRINE HCL 325; 25; 5 MG/1; MG/1; MG/1
5 TABLET ORAL AT BEDTIME
Refills: 0 | Status: DISCONTINUED | OUTPATIENT
Start: 2025-02-13 | End: 2025-02-14

## 2025-02-13 RX ORDER — HYDROMORPHONE HYDROCHLORIDE 4 MG/ML
0.5 INJECTION, SOLUTION INTRAMUSCULAR; INTRAVENOUS; SUBCUTANEOUS
Refills: 0 | Status: DISCONTINUED | OUTPATIENT
Start: 2025-02-13 | End: 2025-02-13

## 2025-02-13 RX ORDER — DM/PSEUDOEPHED/ACETAMINOPHEN 10-30-250
25 CAPSULE ORAL ONCE
Refills: 0 | Status: DISCONTINUED | OUTPATIENT
Start: 2025-02-13 | End: 2025-02-14

## 2025-02-13 RX ORDER — INSULIN LISPRO 100/ML
VIAL (ML) SUBCUTANEOUS
Refills: 0 | Status: DISCONTINUED | OUTPATIENT
Start: 2025-02-13 | End: 2025-02-14

## 2025-02-13 RX ORDER — AMPICILLIN SODIUM AND SULBACTAM SODIUM 2; 1 G/1; G/1
3 INJECTION, POWDER, FOR SOLUTION INTRAMUSCULAR; INTRAVENOUS EVERY 6 HOURS
Refills: 0 | Status: DISCONTINUED | OUTPATIENT
Start: 2025-02-13 | End: 2025-02-14

## 2025-02-13 RX ORDER — AMLODIPINE BESYLATE 5 MG
10 TABLET ORAL DAILY
Refills: 0 | Status: DISCONTINUED | OUTPATIENT
Start: 2025-02-13 | End: 2025-02-14

## 2025-02-13 RX ORDER — SODIUM CHLORIDE 9 G/ML
1000 INJECTION, SOLUTION INTRAVENOUS
Refills: 0 | Status: DISCONTINUED | OUTPATIENT
Start: 2025-02-13 | End: 2025-02-14

## 2025-02-13 RX ORDER — DM/PSEUDOEPHED/ACETAMINOPHEN 10-30-250
15 CAPSULE ORAL ONCE
Refills: 0 | Status: DISCONTINUED | OUTPATIENT
Start: 2025-02-13 | End: 2025-02-14

## 2025-02-13 RX ORDER — ENOXAPARIN SODIUM 100 MG/ML
40 INJECTION SUBCUTANEOUS EVERY 24 HOURS
Refills: 0 | Status: DISCONTINUED | OUTPATIENT
Start: 2025-02-13 | End: 2025-02-14

## 2025-02-13 RX ORDER — ANTISEPTIC SURGICAL SCRUB 0.04 MG/ML
1 SOLUTION TOPICAL
Refills: 0 | Status: DISCONTINUED | OUTPATIENT
Start: 2025-02-13 | End: 2025-02-14

## 2025-02-13 RX ORDER — ZOLPIDEM TARTRATE 5 MG/1
5 TABLET, COATED ORAL AT BEDTIME
Refills: 0 | Status: DISCONTINUED | OUTPATIENT
Start: 2025-02-13 | End: 2025-02-14

## 2025-02-13 RX ORDER — MORPHINE SULFATE 60 MG/1
2 TABLET, FILM COATED, EXTENDED RELEASE ORAL EVERY 4 HOURS
Refills: 0 | Status: DISCONTINUED | OUTPATIENT
Start: 2025-02-13 | End: 2025-02-14

## 2025-02-13 RX ORDER — ACETAMINOPHEN 160 MG/5ML
650 SUSPENSION ORAL EVERY 6 HOURS
Refills: 0 | Status: DISCONTINUED | OUTPATIENT
Start: 2025-02-13 | End: 2025-02-14

## 2025-02-13 RX ORDER — GLUCAGON 3 MG/1
1 POWDER NASAL ONCE
Refills: 0 | Status: DISCONTINUED | OUTPATIENT
Start: 2025-02-13 | End: 2025-02-14

## 2025-02-13 RX ORDER — ONDANSETRON 4 MG/1
4 TABLET, ORALLY DISINTEGRATING ORAL ONCE
Refills: 0 | Status: DISCONTINUED | OUTPATIENT
Start: 2025-02-13 | End: 2025-02-13

## 2025-02-13 RX ADMIN — ANTISEPTIC SURGICAL SCRUB 1 APPLICATION(S): 0.04 SOLUTION TOPICAL at 05:10

## 2025-02-13 RX ADMIN — ZOLPIDEM TARTRATE 5 MILLIGRAM(S): 5 TABLET, COATED ORAL at 23:51

## 2025-02-13 RX ADMIN — AMPICILLIN SODIUM AND SULBACTAM SODIUM 200 GRAM(S): 2; 1 INJECTION, POWDER, FOR SOLUTION INTRAMUSCULAR; INTRAVENOUS at 11:44

## 2025-02-13 RX ADMIN — ENOXAPARIN SODIUM 40 MILLIGRAM(S): 100 INJECTION SUBCUTANEOUS at 21:23

## 2025-02-13 RX ADMIN — Medication 30 MILLIGRAM(S): at 05:10

## 2025-02-13 RX ADMIN — Medication 10 MILLIGRAM(S): at 05:10

## 2025-02-13 RX ADMIN — Medication 4: at 17:43

## 2025-02-13 RX ADMIN — AMPICILLIN SODIUM AND SULBACTAM SODIUM 200 GRAM(S): 2; 1 INJECTION, POWDER, FOR SOLUTION INTRAMUSCULAR; INTRAVENOUS at 05:10

## 2025-02-13 RX ADMIN — AMPICILLIN SODIUM AND SULBACTAM SODIUM 200 GRAM(S): 2; 1 INJECTION, POWDER, FOR SOLUTION INTRAMUSCULAR; INTRAVENOUS at 23:51

## 2025-02-13 RX ADMIN — MORPHINE SULFATE 2 MILLIGRAM(S): 60 TABLET, FILM COATED, EXTENDED RELEASE ORAL at 21:22

## 2025-02-13 RX ADMIN — AMPICILLIN SODIUM AND SULBACTAM SODIUM 200 GRAM(S): 2; 1 INJECTION, POWDER, FOR SOLUTION INTRAMUSCULAR; INTRAVENOUS at 17:43

## 2025-02-13 RX ADMIN — MORPHINE SULFATE 2 MILLIGRAM(S): 60 TABLET, FILM COATED, EXTENDED RELEASE ORAL at 22:20

## 2025-02-13 RX ADMIN — ACETAMINOPHEN, DIPHENHYDRAMINE HCL, PHENYLEPHRINE HCL 5 MILLIGRAM(S): 325; 25; 5 TABLET ORAL at 21:23

## 2025-02-13 NOTE — BRIEF OPERATIVE NOTE - COMMENTS
Clean margins obtained; surgical site closed  Podiatry to change dressing AM Friday 2/14  Stable for discharge with PO abx after dressing change

## 2025-02-13 NOTE — PRE-ANESTHESIA EVALUATION ADULT - NSANTHADDINFOFT_GEN_ALL_CORE
MAC planned, backup GA  Discussed risks, including dental injury and more serious complications including cardiac and pulmonary complications and stroke.  Patient expresses understanding with regard to risks of anesthesia and wishes to proceed. -

## 2025-02-13 NOTE — PROGRESS NOTE ADULT - ASSESSMENT
69-year-old male with a past medical history of hypertension, diabetes, rheumatoid arthritis sent in by podiatry for left foot wound.  Patient obtained an laceration to his left second toe the beginning of last month and has experienced trouble healing since then.    #L foot second digit toe wound   - r/o osteomyelitis vs superficial infection  - HDS, afebrile  - no leukocytosis  - LABS: ESR 18, CRP 3.4   - Foot xray: No definite acute displaced fracture or dislocation. Tarsal/metatarsal alignment appears maintained. Stable postoperative changes of the great toe and metatarsals. Osteopenia and degenerative changes without difference  - Podiatry saw in ED: recommended MRI L foot for osteo for possible sx vs long term abx  -  MRI left foot: ulcer at the 2nd distal, middle and proximal phalanges. osteo of the 2nd distal and middle phalanges and likely distal half of the second proximal phalynx  -  VA arterial duplex: Normal arterial flow in the left lower extremity.  - ID: Unasyn 3 gm IV q6   - follow up with podiatry: plan for OR today    # Medical risk stratification  - currrently vitals are stable and WNL  - labs grossly WNL  - METs at baseline >4  - RCRI score 1->6% risk of major cardiac event  - considered low risk for a low risk procedure  - this does not clear the patient to go to the OR for surgery but rather provides the surgical team a risk stratification to assist weigh the risk vs benefit of proceeding forward with surgical intervention  #HTN  - c/w amLODIPine   Tablet 10 milliGRAM(s) Oral daily  - c/w enalapril 30 milliGRAM(s) Oral daily  - c/w hydrochlorothiazide 12.5 milliGRAM(s) Oral daily    #Diabetes  - A1C 6.4%  - ISS  - metformin and Ozempic at home    #RA  - used to take Enbrel but stopped taking a month ago for concerns of side effects    #depression/ anxiety  - buPROPion XL (24-Hour) . 150 milliGRAM(s) Oral daily    DVT:    PPI:  Diet: regular    #Progress Note Handoff  Pending (specify):  podiatry follow up, ID follow up, PT eval  Family discussion: updated  Disposition:  Unknown at this time________

## 2025-02-13 NOTE — MEDICAL STUDENT PROGRESS NOTE(EDUCATION) - SUBJECTIVE AND OBJECTIVE BOX
SUBJECTIVE/OVERNIGHT EVENTS  Today is hospital day 3d. This morning patient was seen and examined at bedside, resting comfortably in bed. No acute or major events overnight.     MEDICATIONS  STANDING MEDICATIONS  amLODIPine   Tablet 10 milliGRAM(s) Oral daily  ampicillin/sulbactam  IVPB 3 Gram(s) IV Intermittent every 6 hours  ampicillin/sulbactam  IVPB      buPROPion XL (24-Hour) . 150 milliGRAM(s) Oral daily  chlorhexidine 2% Cloths 1 Application(s) Topical <User Schedule>  dextrose 5%. 1000 milliLiter(s) IV Continuous <Continuous>  dextrose 5%. 1000 milliLiter(s) IV Continuous <Continuous>  dextrose 50% Injectable 25 Gram(s) IV Push once  dextrose 50% Injectable 12.5 Gram(s) IV Push once  dextrose 50% Injectable 25 Gram(s) IV Push once  enalapril 30 milliGRAM(s) Oral daily  enoxaparin Injectable 40 milliGRAM(s) SubCutaneous every 24 hours  glucagon  Injectable 1 milliGRAM(s) IntraMuscular once  hydrochlorothiazide 12.5 milliGRAM(s) Oral daily  insulin lispro (ADMELOG) corrective regimen sliding scale   SubCutaneous three times a day before meals  melatonin 5 milliGRAM(s) Oral at bedtime    PRN MEDICATIONS  acetaminophen     Tablet .. 650 milliGRAM(s) Oral every 6 hours PRN  dextrose Oral Gel 15 Gram(s) Oral once PRN  LORazepam     Tablet 1 milliGRAM(s) Oral daily PRN  oxycodone    5 mG/acetaminophen 325 mG 1 Tablet(s) Oral every 6 hours PRN    VITALS  T(F): 97.9 (02-13-25 @ 04:55), Max: 97.9 (02-12-25 @ 20:31)  HR: 64 (02-13-25 @ 04:55) (64 - 85)  BP: 112/65 (02-13-25 @ 04:55) (111/67 - 127/68)  RR: 18 (02-13-25 @ 04:55) (18 - 18)  SpO2: 95% (02-13-25 @ 04:55) (95% - 98%)  POCT Blood Glucose.: 120 mg/dL (02-13-25 @ 07:52)  POCT Blood Glucose.: 115 mg/dL (02-12-25 @ 20:43)  POCT Blood Glucose.: 127 mg/dL (02-12-25 @ 16:58)  POCT Blood Glucose.: 181 mg/dL (02-12-25 @ 11:30)    PHYSICAL EXAM    GENERAL: NAD, lying in bed comfortably  HEAD:  Atraumatic, normocephalic  EYES: EOMI, PERRLA, conjunctiva and sclera clear  ENT: Moist mucous membranes  NECK: Supple, no JVD  HEART: Regular rate and rhythm, no murmurs, rubs, or gallops  LUNGS: Unlabored respirations.  Clear to auscultation bilaterally, no crackles, wheezing, or rhonchi  ABDOMEN: Soft, nontender, nondistended, +BS  EXTREMITIES: 2+ peripheral pulses bilaterally. No clubbing, cyanosis, or edema  NERVOUS SYSTEM:  A&Ox3, no focal deficits   SKIN: No rashes or lesions, L toe wound not visulized    LABS             12.7   8.05  )-----------( 279      ( 02-12-25 @ 06:50 )             36.8     142  |  105  |  13  -------------------------<  119   02-12-25 @ 06:50  4.0  |  25  |  0.8    Ca      8.9     02-12-25 @ 06:50    TPro  6.3  /  Alb  4.2  /  TBili  0.2  /  DBili  x   /  AST  13  /  ALT  12  /  AlkPhos  67  /  GGT  x     02-12-25 @ 06:50        Urinalysis Basic - ( 12 Feb 2025 06:50 )    Color: x / Appearance: x / SG: x / pH: x  Gluc: 119 mg/dL / Ketone: x  / Bili: x / Urobili: x   Blood: x / Protein: x / Nitrite: x   Leuk Esterase: x / RBC: x / WBC x   Sq Epi: x / Non Sq Epi: x / Bacteria: x          Culture - Blood (collected 10 Feb 2025 15:34)  Source: .Blood BLOOD  Preliminary Report (12 Feb 2025 23:08):    No growth at 48 Hours    Culture - Blood (collected 10 Feb 2025 15:34)  Source: .Blood BLOOD  Preliminary Report (12 Feb 2025 23:08):    No growth at 48 Hours      IMAGING    ASSESSMENT  69 year old male with past medical history of hypertension, diabetes, rheumatoid arthritis sent in by podiatry for left foot wound. Patient obtained a laceration to his second toe at the beginning of last month. Per patient he was unaware of incident until he noticed it was not healing properly. Pt was admitted to rule out osteomyelitis, MRI completed on 2/11 revealed subcutaneous edema throughout the second toe with punctuate air foci, confluent T1 hypointense marrow signal abnormality throughout the second distal phalanx and middle phalanx consistent with osteomyelitis. Scheduled for OR 2/13 with podiatry.    #L foot second digit toe wound r/o osteomyelitis vs superficial infection  -LABS: ESR 18, CRP 3.4  -Foot arterial duplex: normal flow  -Foot xray: no definite acute displaced fracture or dislocation. Tarsal/metatarsal alignment appears maintained. Stable postoperative changes of the great toe and metatarsals. Osteopenia and degenerative changes without difference  -Foot MRI: subcutaneous edema throughout the second toe with punctuate air foci, confluent T1 hypointense marrow signal abnormality throughout the second distal phalanx and middle phalanx consistent with osteomyelitis  -Podiatry: continue unasyn, order ALEXANDRE, MARK, Mc, T&S, surgical intervention scheduled for 2/13  -Consulted ID for antibiotics recommendations post surgery   -PT referral for post surgery rehab    #HTN  -c/w home meds    #Diabetes  -A1c 6.4%  -ISS  -metformin and Ozempic at home    #RA  - discontinued Enbrel 1 mo ago due to side effects of poor wound healing    #depression/anxiety  -home meds: bupropion and pristique  - continue bupropion    Diet: regular    Pending: Podiatry: ALEXANDRE, MARK, Mc, T&S, surgical intervention scheduled for 2/13, ID: antibiotic reccomendations SUBJECTIVE/OVERNIGHT EVENTS  Today is hospital day 3d. This morning patient was seen and examined at bedside, resting comfortably in bed. No acute or major events overnight. Normal urination and BM. NPO since midnight per podiatry surgical plans.    MEDICATIONS  STANDING MEDICATIONS  amLODIPine   Tablet 10 milliGRAM(s) Oral daily  ampicillin/sulbactam  IVPB 3 Gram(s) IV Intermittent every 6 hours  ampicillin/sulbactam  IVPB      buPROPion XL (24-Hour) . 150 milliGRAM(s) Oral daily  chlorhexidine 2% Cloths 1 Application(s) Topical <User Schedule>  dextrose 5%. 1000 milliLiter(s) IV Continuous <Continuous>  dextrose 5%. 1000 milliLiter(s) IV Continuous <Continuous>  dextrose 50% Injectable 25 Gram(s) IV Push once  dextrose 50% Injectable 12.5 Gram(s) IV Push once  dextrose 50% Injectable 25 Gram(s) IV Push once  enalapril 30 milliGRAM(s) Oral daily  enoxaparin Injectable 40 milliGRAM(s) SubCutaneous every 24 hours  glucagon  Injectable 1 milliGRAM(s) IntraMuscular once  hydrochlorothiazide 12.5 milliGRAM(s) Oral daily  insulin lispro (ADMELOG) corrective regimen sliding scale   SubCutaneous three times a day before meals  melatonin 5 milliGRAM(s) Oral at bedtime    PRN MEDICATIONS  acetaminophen     Tablet .. 650 milliGRAM(s) Oral every 6 hours PRN  dextrose Oral Gel 15 Gram(s) Oral once PRN  LORazepam     Tablet 1 milliGRAM(s) Oral daily PRN  oxycodone    5 mG/acetaminophen 325 mG 1 Tablet(s) Oral every 6 hours PRN    VITALS  T(F): 97.9 (02-13-25 @ 04:55), Max: 97.9 (02-12-25 @ 20:31)  HR: 64 (02-13-25 @ 04:55) (64 - 85)  BP: 112/65 (02-13-25 @ 04:55) (111/67 - 127/68)  RR: 18 (02-13-25 @ 04:55) (18 - 18)  SpO2: 95% (02-13-25 @ 04:55) (95% - 98%)  POCT Blood Glucose.: 120 mg/dL (02-13-25 @ 07:52)  POCT Blood Glucose.: 115 mg/dL (02-12-25 @ 20:43)  POCT Blood Glucose.: 127 mg/dL (02-12-25 @ 16:58)  POCT Blood Glucose.: 181 mg/dL (02-12-25 @ 11:30)    PHYSICAL EXAM    GENERAL: NAD, lying in bed comfortably  HEAD:  Atraumatic, normocephalic  EYES: EOMI, PERRLA, conjunctiva and sclera clear  ENT: Moist mucous membranes  NECK: Supple, no JVD  HEART: Regular rate and rhythm, no murmurs, rubs, or gallops  LUNGS: Unlabored respirations.  Clear to auscultation bilaterally, no crackles, wheezing, or rhonchi  ABDOMEN: Soft, nontender, nondistended, +BS  EXTREMITIES: 2+ peripheral pulses bilaterally. No clubbing, cyanosis, or edema  NERVOUS SYSTEM:  A&Ox3, no focal deficits   SKIN: No rashes or lesions, L toe wound not visulized    LABS             12.7   8.05  )-----------( 279      ( 02-12-25 @ 06:50 )             36.8     142  |  105  |  13  -------------------------<  119   02-12-25 @ 06:50  4.0  |  25  |  0.8    Ca      8.9     02-12-25 @ 06:50    TPro  6.3  /  Alb  4.2  /  TBili  0.2  /  DBili  x   /  AST  13  /  ALT  12  /  AlkPhos  67  /  GGT  x     02-12-25 @ 06:50        Urinalysis Basic - ( 12 Feb 2025 06:50 )    Color: x / Appearance: x / SG: x / pH: x  Gluc: 119 mg/dL / Ketone: x  / Bili: x / Urobili: x   Blood: x / Protein: x / Nitrite: x   Leuk Esterase: x / RBC: x / WBC x   Sq Epi: x / Non Sq Epi: x / Bacteria: x          Culture - Blood (collected 10 Feb 2025 15:34)  Source: .Blood BLOOD  Preliminary Report (12 Feb 2025 23:08):    No growth at 48 Hours    Culture - Blood (collected 10 Feb 2025 15:34)  Source: .Blood BLOOD  Preliminary Report (12 Feb 2025 23:08):    No growth at 48 Hours      IMAGING    ASSESSMENT  69 year old male with past medical history of hypertension, diabetes, rheumatoid arthritis sent in by podiatry for left foot wound. Patient obtained a laceration to his second toe at the beginning of last month. Per patient he was unaware of incident until he noticed it was not healing properly. Pt was admitted to rule out osteomyelitis, MRI completed on 2/11 revealed subcutaneous edema throughout the second toe with punctuate air foci, confluent T1 hypointense marrow signal abnormality throughout the second distal phalanx and middle phalanx consistent with osteomyelitis. Scheduled for OR 2/13 with podiatry.    #L foot second digit toe wound r/o osteomyelitis vs superficial infection  -LABS: ESR 18, CRP 3.4  -Foot arterial duplex: normal flow  -Foot xray: no definite acute displaced fracture or dislocation. Tarsal/metatarsal alignment appears maintained. Stable postoperative changes of the great toe and metatarsals. Osteopenia and degenerative changes without difference  -Foot MRI: subcutaneous edema throughout the second toe with punctuate air foci, confluent T1 hypointense marrow signal abnormality throughout the second distal phalanx and middle phalanx consistent with osteomyelitis  -Podiatry: continue unasyn, order CBC, BMP, Coags, T&S, surgical intervention scheduled for 2/13  -Consulted ID for antibiotics recommendations post surgery   -PT referral for post surgery rehab    #HTN  -c/w home meds    #Diabetes  -A1c 6.4%  -ISS  -metformin and Ozempic at home    #RA  - discontinued Enbrel 1 mo ago due to side effects of poor wound healing    #depression/anxiety  -home meds: bupropion and pristique  - continue bupropion    Diet: regular    Pending: Podiatry: CBC, BMP, Coags, T&S, surgical intervention scheduled for 2/13, ID: antibiotic reccomendations

## 2025-02-13 NOTE — PRE-ANESTHESIA EVALUATION ADULT - HEART RATE (BEATS/MIN)
64
For information on Fall & Injury Prevention, visit: [email or web address] OR  [email or web address] OR  https://www.cdc.gov/steadi/patient.html

## 2025-02-13 NOTE — PROGRESS NOTE ADULT - SUBJECTIVE AND OBJECTIVE BOX
ESDRAS RAMIREZ  69y  Male      Patient is a 69y old  Male who presents with a chief complaint of left second digit toe wound (12 Feb 2025 13:01)      INTERVAL HPI/OVERNIGHT EVENTS: no acute events overnight. NPO since MN        T(C): 36.6 (02-13-25 @ 11:22), Max: 36.6 (02-12-25 @ 13:15)  HR: 64 (02-13-25 @ 11:22) (64 - 85)  BP: 112/65 (02-13-25 @ 11:22) (111/67 - 127/68)  RR: 18 (02-13-25 @ 11:22) (18 - 18)  SpO2: 95% (02-13-25 @ 11:22) (95% - 98%)  Wt(kg): --Vital Signs Last 24 Hrs  T(C): 36.6 (13 Feb 2025 11:22), Max: 36.6 (12 Feb 2025 13:15)  T(F): 97.9 (13 Feb 2025 04:55), Max: 97.9 (12 Feb 2025 20:31)  HR: 64 (13 Feb 2025 11:22) (64 - 85)  BP: 112/65 (13 Feb 2025 11:22) (111/67 - 127/68)  BP(mean): 81 (13 Feb 2025 11:22) (81 - 87)  RR: 18 (13 Feb 2025 11:22) (18 - 18)  SpO2: 95% (13 Feb 2025 11:22) (95% - 98%)    Parameters below as of 13 Feb 2025 04:55  Patient On (Oxygen Delivery Method): room air            PHYSICAL EXAM:  GENERAL: elderly M, NAD, non toxic appearing  EYES: anicteric sclera, non injected conjunctiva, EOMI  PSYCH: no agitation, baseline mentation  NERVOUS SYSTEM:  Alert & Oriented X3   PULMONARY: symmetrical chest rise, no accessory muscle use  CARDIOVASCULAR: non tachycardic  EXTREMITIES: LLE wrapped in bandages  SKIN: No rashes or lesions      Consultant(s) Notes Reviewed:  [x ] YES  [ ] NO    Discussed with Consultants/Other Providers [ x] YES     LABS                          12.7   8.05  )-----------( 279      ( 12 Feb 2025 06:50 )             36.8     02-12    142  |  105  |  13  ----------------------------<  119[H]  4.0   |  25  |  0.8    Ca    8.9      12 Feb 2025 06:50    TPro  6.3  /  Alb  4.2  /  TBili  0.2  /  DBili  x   /  AST  13  /  ALT  12  /  AlkPhos  67  02-12      Urinalysis Basic - ( 12 Feb 2025 06:50 )    Color: x / Appearance: x / SG: x / pH: x  Gluc: 119 mg/dL / Ketone: x  / Bili: x / Urobili: x   Blood: x / Protein: x / Nitrite: x   Leuk Esterase: x / RBC: x / WBC x   Sq Epi: x / Non Sq Epi: x / Bacteria: x    Lactate Trend  02-10 @ 19:36 Lactate:2.7   02-10 @ 16:25 Lactate:2.4       POCT Blood Glucose.: 129 mg/dL (13 Feb 2025 11:23)    RADIOLOGY & ADDITIONAL TESTS:  - no images  Imaging Personally Reviewed:  [ ] YES  [ ] NO    HEALTH ISSUES - PROBLEM Dx:      MEDICATIONS  (STANDING):  amLODIPine   Tablet 10 milliGRAM(s) Oral daily  ampicillin/sulbactam  IVPB 3 Gram(s) IV Intermittent every 6 hours  ampicillin/sulbactam  IVPB      buPROPion XL (24-Hour) . 150 milliGRAM(s) Oral daily  chlorhexidine 2% Cloths 1 Application(s) Topical <User Schedule>  dextrose 5%. 1000 milliLiter(s) (100 mL/Hr) IV Continuous <Continuous>  dextrose 5%. 1000 milliLiter(s) (50 mL/Hr) IV Continuous <Continuous>  dextrose 50% Injectable 25 Gram(s) IV Push once  dextrose 50% Injectable 12.5 Gram(s) IV Push once  dextrose 50% Injectable 25 Gram(s) IV Push once  enalapril 30 milliGRAM(s) Oral daily  enoxaparin Injectable 40 milliGRAM(s) SubCutaneous every 24 hours  glucagon  Injectable 1 milliGRAM(s) IntraMuscular once  hydrochlorothiazide 12.5 milliGRAM(s) Oral daily  insulin lispro (ADMELOG) corrective regimen sliding scale   SubCutaneous three times a day before meals  lactated ringers. 1000 milliLiter(s) (100 mL/Hr) IV Continuous <Continuous>  melatonin 5 milliGRAM(s) Oral at bedtime    MEDICATIONS  (PRN):  acetaminophen     Tablet .. 650 milliGRAM(s) Oral every 6 hours PRN Temp greater or equal to 38C (100.4F), Mild Pain (1 - 3)  dextrose Oral Gel 15 Gram(s) Oral once PRN Blood Glucose LESS THAN 70 milliGRAM(s)/deciliter  HYDROmorphone  Injectable 0.5 milliGRAM(s) IV Push every 10 minutes PRN Moderate Pain (4 - 6)  LORazepam     Tablet 1 milliGRAM(s) Oral daily PRN Anxiety  ondansetron Injectable 4 milliGRAM(s) IV Push once PRN Nausea and/or Vomiting  oxycodone    5 mG/acetaminophen 325 mG 1 Tablet(s) Oral every 6 hours PRN Severe Pain (7 - 10)

## 2025-02-13 NOTE — CHART NOTE - NSCHARTNOTEFT_GEN_A_CORE
PACU ANESTHESIA ADMISSION NOTE      Procedure: Ray amputation of second toe  Left foot      Post op diagnosis:  Osteomyelitis        ____  Intubated  TV:______       Rate: ______      FiO2: ______    _x___  Patent Airway    _x___  Full return of protective reflexes    _x___  Full recovery from anesthesia / back to baseline status    Vitals  SPO2:-100  HR:-66  RR:-11  B.P:-122/66  TEMp:-98    Mental Status:  _x___ Awake   ___x_ Alert   _____ Drowsy   _____ Sedated    Nausea/Vomiting:  _x___  NO       ______Yes,   See Post - Op Orders         Pain Scale (0-10):  __0___    Treatment: _x___ None    __x__ See Post - Op/PCA Orders    Post - Operative Fluids:   ___ Oral   ____x See Post - Op Orders    Plan: Discharge:   ____Home       ___x__Floor     _____Critical Care    _____  Other:_________________    Comments:  Report endorsed to RN in pacu  Vitals stable  No anesthesia issues or complications noted.  Discharge to patient to floor / home when criteria met.

## 2025-02-14 ENCOUNTER — TRANSCRIPTION ENCOUNTER (OUTPATIENT)
Age: 70
End: 2025-02-14

## 2025-02-14 VITALS
HEART RATE: 80 BPM | DIASTOLIC BLOOD PRESSURE: 70 MMHG | TEMPERATURE: 98 F | SYSTOLIC BLOOD PRESSURE: 131 MMHG | OXYGEN SATURATION: 98 % | RESPIRATION RATE: 18 BRPM

## 2025-02-14 LAB
GLUCOSE BLDC GLUCOMTR-MCNC: 120 MG/DL — HIGH (ref 70–99)
GLUCOSE BLDC GLUCOMTR-MCNC: 129 MG/DL — HIGH (ref 70–99)

## 2025-02-14 PROCEDURE — 99239 HOSP IP/OBS DSCHRG MGMT >30: CPT

## 2025-02-14 RX ORDER — OXYCODONE HYDROCHLORIDE 30 MG/1
1 TABLET ORAL
Qty: 20 | Refills: 0
Start: 2025-02-14 | End: 2025-02-18

## 2025-02-14 RX ORDER — ENALAPRIL MALEATE 20 MG
1 TABLET ORAL
Refills: 0 | DISCHARGE

## 2025-02-14 RX ORDER — AMOXICILLIN AND CLAVULANATE POTASSIUM 200; 28.5 MG/5ML; MG/5ML
875 POWDER, FOR SUSPENSION ORAL
Qty: 14 | Refills: 0
Start: 2025-02-14 | End: 2025-02-20

## 2025-02-14 RX ADMIN — Medication 30 MILLIGRAM(S): at 05:17

## 2025-02-14 RX ADMIN — ANTISEPTIC SURGICAL SCRUB 1 APPLICATION(S): 0.04 SOLUTION TOPICAL at 05:18

## 2025-02-14 RX ADMIN — Medication 10 MILLIGRAM(S): at 05:17

## 2025-02-14 RX ADMIN — BUPROPION HYDROCHLORIDE 150 MILLIGRAM(S): 150 TABLET, EXTENDED RELEASE ORAL at 11:40

## 2025-02-14 RX ADMIN — AMPICILLIN SODIUM AND SULBACTAM SODIUM 200 GRAM(S): 2; 1 INJECTION, POWDER, FOR SOLUTION INTRAMUSCULAR; INTRAVENOUS at 05:17

## 2025-02-14 RX ADMIN — AMPICILLIN SODIUM AND SULBACTAM SODIUM 200 GRAM(S): 2; 1 INJECTION, POWDER, FOR SOLUTION INTRAMUSCULAR; INTRAVENOUS at 11:40

## 2025-02-14 NOTE — DISCHARGE NOTE PROVIDER - HOSPITAL COURSE
69-year-old male with a past medical history of hypertension, diabetes, rheumatoid arthritis sent in by podiatry for left foot wound.  Patient obtained an laceration to his left second toe the beginning of last month and has experienced trouble healing since then. pt denies any other symptoms including fevers, chill, headache, recent illness/travel, cough, abdominal pain, chest pain, or SOB.    ED vitals: 124/72 HR: 77 RR: 18 T:98 SpO2: 99%    Labs: ESR 18 / wbc 9.49 / lactate 2.4     Xray of foot:  No definite acute displaced fracture or dislocation. Tarsal/metatarsal   alignment appears maintained. Stable postoperative changes of the great   toe and metatarsals. Osteopenia and degenerative changes without   difference    Arterial LE duplex: negative    Patient found to have L second digit toe wound. MRI showed osteomyelitis. Podiatry was consulted and performed ray amputation of toe 2/13/25. Patient tolerated procedure well. Patient on Unasyn 3gm as per ID.     Patient's DM was controlled with Insulin sliding scale.

## 2025-02-14 NOTE — PROGRESS NOTE ADULT - SUBJECTIVE AND OBJECTIVE BOX
Podiatry Progress Note    Subjective:  ESDRAS RAMIREZ is a  69y Male.   Seen bedside.   Patient is a 69y old  Male who presents with a chief complaint of left second digit toe wound (13 Feb 2025 11:57)      Past Medical History and Surgical History  PAST MEDICAL & SURGICAL HISTORY:       Objective:  Vital Signs Last 24 Hrs  T(C): 36.8 (14 Feb 2025 05:30), Max: 36.8 (14 Feb 2025 05:30)  T(F): 98.3 (14 Feb 2025 05:30), Max: 98.3 (14 Feb 2025 05:30)  HR: 100 (14 Feb 2025 05:30) (56 - 100)  BP: 123/83 (14 Feb 2025 05:30) (112/60 - 141/65)  BP(mean): 102 (14 Feb 2025 05:30) (81 - 102)  RR: 18 (14 Feb 2025 05:30) (16 - 18)  SpO2: 98% (14 Feb 2025 05:30) (95% - 100%)    Parameters below as of 14 Feb 2025 05:30  Patient On (Oxygen Delivery Method): room air                              Physical Exam Left Lower Extremity Focused:   Derm:   Surgical site stable. Sutures intact. No maceration. No malodor or drainage. Minimal bleeding. Minimal surrounding erythema. No edema. No concerns for surgical site infection at this time.  Vascular: DP and PT Pulses Palpable; Foot is Warm to the touch; Capillary Refill Time < 3 Seconds;    Neuro: Protective Sensation Diminished / Moderately Neuropathic   MSK: s/p 2nd toe amputation of left foot    XR LEFT FOOT 2/10  No definite acute displaced fracture or dislocation. Tarsal/metatarsal alignment appears maintained. Stable postoperative changes of the great toe and metatarsals. Osteopenia and degenerative changes without difference    MRI LEFT FOOT: Ulcer at the second toe tip, with subcutaneous edema throughout the toe   and area of punctate air foci.  Fusion of the second distal, middle and proximal phalanges.  Osteomyelitis involving the second distal and middle phalanges, and   likely the distal half of the second proximal phalanx, as above.    XR LEFT FOOT 2/13  Patient is post interval amputation of the second toe phalanges.   Postsurgical changes are noted in the soft tissues.  Again seen is resection of the third through fifth metatarsal heads.  Again seen is arthrodesis of the first MTP, and screw traversing the   first interphalangeal joint, demonstrating unchanged fracture of the   screw.      Assessment:  s/p 2nd toe amputation Left foot (DOS 2/13/25)    Plan:  Chart reviewed and Patient evaluated. All Questions and Concerns Addressed and Answered  XR Imaging LEFT Foot; Reviewed Results  MRI reviewed results  Weight Bearing Status; WBAT   No further surgery from podiatry  Patient is stable for discharge and can follow up outpatient with Dr. Angulo in clinic.  Plan discussed w/ Dr. Angulo, DPM       Podiatry   Please message on teams for further questions

## 2025-02-14 NOTE — DISCHARGE NOTE PROVIDER - NSDCCPCAREPLAN_GEN_ALL_CORE_FT
PRINCIPAL DISCHARGE DIAGNOSIS  Diagnosis: Wound of foot  Assessment and Plan of Treatment: You were evaluated in the hospital for your L toe wound. You were found to have osteomyelitis infection so Podiatry was consulted. They performed a amputation of the toe which you tolerated the procedure well.  After discharge, you will need to:   - Follow up with your primary care doctor within 1-2 weeks  - Follow up with Podiatry   - Take all the medications you were discharged with, unless otherwise instructed by your healthcare provider(s).   Please follow up with your providers by calling them to make an appointment so that you can see them in 1-2weeks; bring your paperwork from this hospital stay to that visit. You can access your visit information by signing up for an account for the patient portal at [email or web address]   Seek immediate medical attention if you develop fevers, chills, chest pain, shortness of breath, nausea and vomiting, abdominal pain, passing out, weakness or numbness or tingling on one side of your body, or any other concerning signs or symptoms.

## 2025-02-14 NOTE — MEDICAL STUDENT PROGRESS NOTE(EDUCATION) - SUBJECTIVE AND OBJECTIVE BOX
SUBJECTIVE/OVERNIGHT EVENTS  Today is hospital day 4d. This morning patient was seen and examined at bedside, resting comfortably in bed. No acute or major events overnight. Normal urination and BM, appetite. Per pt, pain level assessed as 7/10, localized throbbing sensation left foot p/s ray amputation second toe left foot, relieved by oxycodone 5mg/acetaminophen 325 x1 2/13 PM.    MEDICATIONS  STANDING MEDICATIONS  amLODIPine   Tablet 10 milliGRAM(s) Oral daily  ampicillin/sulbactam  IVPB 3 Gram(s) IV Intermittent every 6 hours  buPROPion XL (24-Hour) . 150 milliGRAM(s) Oral daily  chlorhexidine 2% Cloths 1 Application(s) Topical <User Schedule>  dextrose 5%. 1000 milliLiter(s) IV Continuous <Continuous>  dextrose 5%. 1000 milliLiter(s) IV Continuous <Continuous>  dextrose 50% Injectable 25 Gram(s) IV Push once  dextrose 50% Injectable 25 Gram(s) IV Push once  enalapril 30 milliGRAM(s) Oral daily  enoxaparin Injectable 40 milliGRAM(s) SubCutaneous every 24 hours  glucagon  Injectable 1 milliGRAM(s) IntraMuscular once  hydrochlorothiazide 12.5 milliGRAM(s) Oral daily  insulin lispro (ADMELOG) corrective regimen sliding scale   SubCutaneous three times a day before meals  melatonin 5 milliGRAM(s) Oral at bedtime    PRN MEDICATIONS  acetaminophen     Tablet .. 650 milliGRAM(s) Oral every 6 hours PRN  dextrose Oral Gel 15 Gram(s) Oral once PRN  LORazepam     Tablet 1 milliGRAM(s) Oral daily PRN  morphine  - Injectable 2 milliGRAM(s) IV Push every 4 hours PRN  oxycodone    5 mG/acetaminophen 325 mG 1 Tablet(s) Oral every 6 hours PRN  zolpidem 5 milliGRAM(s) Oral at bedtime PRN    VITALS  T(F): 98.3 (02-14-25 @ 05:30), Max: 98.3 (02-14-25 @ 05:30)  HR: 100 (02-14-25 @ 05:30) (56 - 100)  BP: 123/83 (02-14-25 @ 05:30) (112/60 - 141/65)  RR: 18 (02-14-25 @ 05:30) (16 - 18)  SpO2: 98% (02-14-25 @ 05:30) (96% - 100%)  POCT Blood Glucose.: 129 mg/dL (02-14-25 @ 11:29)  POCT Blood Glucose.: 120 mg/dL (02-14-25 @ 07:54)  POCT Blood Glucose.: 130 mg/dL (02-13-25 @ 21:12)  POCT Blood Glucose.: 209 mg/dL (02-13-25 @ 16:26)  POCT Blood Glucose.: 125 mg/dL (02-13-25 @ 13:57)    PHYSICAL EXAM  GENERAL: NAD, lying in bed comfortably  HEAD:  Atraumatic, normocephalic  EYES: EOMI, PERRLA, conjunctiva and sclera clear  ENT: Moist mucous membranes  NECK: Supple, no JVD  HEART: Regular rate and rhythm, no murmurs, rubs, or gallops  LUNGS: Unlabored respirations.  Clear to auscultation bilaterally, no crackles, wheezing, or rhonchi  ABDOMEN: Soft, nontender, nondistended, +BS  EXTREMITIES: 2+ peripheral pulses bilaterally. No clubbing, cyanosis, or edema  NERVOUS SYSTEM:  A&Ox3, no focal deficits   SKIN: No rashes or lesions, L toe wound not visualized, seen by podiatry     LABS    IMAGING    ASSESSMENT  69 year old male with past medical history of hypertension, diabetes, rheumatoid arthritis sent in by podiatry for left foot wound. Patient obtained a laceration to his second toe at the beginning of last month. Per patient he was unaware of incident until he noticed it was not healing properly. Pt was admitted to assess for osteomyelitis, confirmed with MRI completed on 2/11, podiatry surgical intervention 2/13 resulted in ray amputation of 2nd toe on Left foot completed 2/13, clear margins appreciated, patient planned for d/c 2/14 with Augmentin and pain management medication.    #L foot second digit toe wound r/o osteomyelitis vs superficial infection  -LABS: ESR 18, CRP 3.4  -Foot arterial duplex: normal flow  -Foot xray: no definite acute displaced fracture or dislocation. Tarsal/metatarsal alignment appears maintained. Stable postoperative changes of the great toe and metatarsals. Osteopenia and degenerative changes without difference  -Foot MRI: subcutaneous edema throughout the second toe with punctuate air foci, confluent T1 hypointense marrow signal abnormality throughout the second distal phalanx and middle phalanx consistent with osteomyelitis  -Podiatry: ray amputation second toe left foot 2/13 clean margins appreciated, wound care 2/14 AM continue unasyn until d/c  -ID: upon d/c augmentin po 7day   -PT referral for post surgery rehab    #HTN  -c/w home meds    #Diabetes  -A1c 6.4%  -ISS  -metformin and Ozempic at home    #RA  - discontinued Enbrel 1 mo ago due to side effects of poor wound healing    #depression/anxiety  -home meds: bupropion and pristique  - continue bupropion    Diet: regular  Dispo: d/c to home 2/14 SUBJECTIVE/OVERNIGHT EVENTS  Today is hospital day 4d. This morning patient was seen and examined at bedside, resting comfortably in bed. No acute or major events overnight. Normal urination and BM, appetite. Per pt, pain level assessed as 7/10, localized throbbing sensation left foot p/s ray amputation second toe left foot, relieved by oxycodone 5mg/acetaminophen 325 x1 2/13 PM.    MEDICATIONS  STANDING MEDICATIONS  amLODIPine   Tablet 10 milliGRAM(s) Oral daily  ampicillin/sulbactam  IVPB 3 Gram(s) IV Intermittent every 6 hours  buPROPion XL (24-Hour) . 150 milliGRAM(s) Oral daily  chlorhexidine 2% Cloths 1 Application(s) Topical <User Schedule>  dextrose 5%. 1000 milliLiter(s) IV Continuous <Continuous>  dextrose 5%. 1000 milliLiter(s) IV Continuous <Continuous>  dextrose 50% Injectable 25 Gram(s) IV Push once  dextrose 50% Injectable 25 Gram(s) IV Push once  enalapril 30 milliGRAM(s) Oral daily  enoxaparin Injectable 40 milliGRAM(s) SubCutaneous every 24 hours  glucagon  Injectable 1 milliGRAM(s) IntraMuscular once  hydrochlorothiazide 12.5 milliGRAM(s) Oral daily  insulin lispro (ADMELOG) corrective regimen sliding scale   SubCutaneous three times a day before meals  melatonin 5 milliGRAM(s) Oral at bedtime    PRN MEDICATIONS  acetaminophen     Tablet .. 650 milliGRAM(s) Oral every 6 hours PRN  dextrose Oral Gel 15 Gram(s) Oral once PRN  LORazepam     Tablet 1 milliGRAM(s) Oral daily PRN  morphine  - Injectable 2 milliGRAM(s) IV Push every 4 hours PRN  oxycodone    5 mG/acetaminophen 325 mG 1 Tablet(s) Oral every 6 hours PRN  zolpidem 5 milliGRAM(s) Oral at bedtime PRN    VITALS  T(F): 98.3 (02-14-25 @ 05:30), Max: 98.3 (02-14-25 @ 05:30)  HR: 100 (02-14-25 @ 05:30) (56 - 100)  BP: 123/83 (02-14-25 @ 05:30) (112/60 - 141/65)  RR: 18 (02-14-25 @ 05:30) (16 - 18)  SpO2: 98% (02-14-25 @ 05:30) (96% - 100%)  POCT Blood Glucose.: 129 mg/dL (02-14-25 @ 11:29)  POCT Blood Glucose.: 120 mg/dL (02-14-25 @ 07:54)  POCT Blood Glucose.: 130 mg/dL (02-13-25 @ 21:12)  POCT Blood Glucose.: 209 mg/dL (02-13-25 @ 16:26)  POCT Blood Glucose.: 125 mg/dL (02-13-25 @ 13:57)    PHYSICAL EXAM  GENERAL: NAD, lying in bed comfortably  HEAD:  Atraumatic, normocephalic  EYES: EOMI, PERRLA, conjunctiva and sclera clear  ENT: Moist mucous membranes  NECK: Supple, no JVD  HEART: Regular rate and rhythm, no murmurs, rubs, or gallops  LUNGS: Unlabored respirations.  Clear to auscultation bilaterally, no crackles, wheezing, or rhonchi  ABDOMEN: Soft, nontender, nondistended, +BS  EXTREMITIES: 2+ peripheral pulses bilaterally. No clubbing, cyanosis, or edema  NERVOUS SYSTEM:  A&Ox3, no focal deficits   SKIN: No rashes or lesions, L toe wound not visualized, seen by podiatry     LABS    IMAGING    ASSESSMENT  69 year old male with past medical history of hypertension, diabetes, rheumatoid arthritis sent in by podiatry for left foot wound. Patient obtained a laceration to his second toe at the beginning of last month. Per patient he was unaware of incident until he noticed it was not healing properly. Pt was admitted to assess for osteomyelitis, confirmed with MRI completed on 2/11, podiatry surgical intervention 2/13 resulted in ray amputation of 2nd toe on Left foot completed 2/13, clear margins appreciated, patient planned for d/c 2/14 with po Augmentin 875 mg q12h for 7 more days and pain management medication.    #L foot second digit toe wound r/o osteomyelitis vs superficial infection  -LABS: ESR 18, CRP 3.4  -Foot arterial duplex: normal flow  -Foot xray: no definite acute displaced fracture or dislocation. Tarsal/metatarsal alignment appears maintained. Stable postoperative changes of the great toe and metatarsals. Osteopenia and degenerative changes without difference  -Foot MRI: subcutaneous edema throughout the second toe with punctuate air foci, confluent T1 hypointense marrow signal abnormality throughout the second distal phalanx and middle phalanx consistent with osteomyelitis  -Podiatry: ray amputation second toe left foot 2/13 clean margins appreciated, wound care 2/14 AM continue unasyn until d/c  -ID: upon d/c off loading to prevent pressure sores and preventive measures to avoid aspiration, abx recommendation: po Augmentin 875 mg q12h for 7 more days    augmentin po 7day   -PT referral for post surgery rehab    #HTN  -c/w home meds    #Diabetes  -A1c 6.4%  -ISS  -metformin and Ozempic at home    #RA  - discontinued Enbrel 1 mo ago due to side effects of poor wound healing    #depression/anxiety  -home meds: bupropion and pristique  - continue bupropion    Diet: regular  Dispo: d/c to home 2/14

## 2025-02-14 NOTE — PHYSICAL THERAPY INITIAL EVALUATION ADULT - GENERAL OBSERVATIONS, REHAB EVAL
Pt encountered supine in bed, A & O x 3 in NAD, Lt foot +ace wrapped dressing intact, LLE NWB secondary to s/p 2nd toe amputation Left foot (DOS 2/13/25), no c/o pain. Pt is indep in bed mobility, Min A in transfer mobility and ambulated 10 ft x 2 Min A using RW with hop-to gait. Pt attempted stair training using 2 railing/or 1HR with axillary crutch but unable to perform despite Max A. Pt declined Generaytormping tech stated he has bad shoulders. Pt will benefit from skilled PT 3-5x/wk for thera ex, transfer training, balance activity and gait training to improve mobility and return to previous level of function.

## 2025-02-14 NOTE — DISCHARGE NOTE NURSING/CASE MANAGEMENT/SOCIAL WORK - PATIENT PORTAL LINK FT
You can access the FollowMyHealth Patient Portal offered by [facility] by registering at the following website: [email or web address]. By joining [facility]’s FollowMyHealth portal, you will also be able to view your health information using other applications (apps) compatible with our system.

## 2025-02-14 NOTE — DISCHARGE NOTE PROVIDER - NSDCMRMEDTOKEN_GEN_ALL_CORE_FT
amLODIPine 10 mg oral tablet: 1 tab(s) orally once a day  buPROPion 100 mg oral tablet: 1 tab(s) orally once a day  Calciferol 1.25 mg (50,000 intl units) oral tablet: 1 tab(s) orally once a week  enalapril 10 mg oral tablet: 1 tab(s) orally once a day  enalapril 20 mg oral tablet: 1 tab(s) orally once a day  hydroCHLOROthiazide 12.5 mg oral tablet: 1 tab(s) orally once a day  metFORMIN 1000 mg oral tablet: 1 tab(s) orally 2 times a day  Ozempic 2 mg/1.5 mL (1 mg dose) subcutaneous solution: 1 dose(s) subcutaneous once a week  Pristiq 100 mg oral tablet, extended release: 1 tab(s) orally once a day   amLODIPine 10 mg oral tablet: 1 tab(s) orally once a day  amoxicillin-clavulanate 875 mg-125 mg oral tablet: 875 milligram(s) orally 2 times a day  buPROPion 100 mg oral tablet: 1 tab(s) orally once a day  Calciferol 1.25 mg (50,000 intl units) oral tablet: 1 tab(s) orally once a week  enalapril 20 mg oral tablet: 1 tab(s) orally once a day  hydroCHLOROthiazide 12.5 mg oral tablet: 1 tab(s) orally once a day  metFORMIN 1000 mg oral tablet: 1 tab(s) orally 2 times a day  oxyCODONE 5 mg oral tablet: 1 tab(s) orally every 6 hours MDD: 4 tabs  Ozempic 2 mg/1.5 mL (1 mg dose) subcutaneous solution: 1 dose(s) subcutaneous once a week  Pristiq 100 mg oral tablet, extended release: 1 tab(s) orally once a day

## 2025-02-14 NOTE — PROGRESS NOTE ADULT - REASON FOR ADMISSION
left second digit toe wound

## 2025-02-14 NOTE — DISCHARGE NOTE PROVIDER - ATTENDING DISCHARGE PHYSICAL EXAMINATION:
GENERAL: elderly M, NAD, non toxic appearing  EYES: anicteric sclera, non injected conjunctiva, EOMI  PSYCH: no agitation, baseline mentation  NERVOUS SYSTEM:  Alert & Oriented X3   PULMONARY: symmetrical chest rise, no accessory muscle use  CARDIOVASCULAR: non tachycardic  EXTREMITIES: LLE wrapped in bandages  SKIN: No rashes or lesions

## 2025-02-14 NOTE — PROGRESS NOTE ADULT - SUBJECTIVE AND OBJECTIVE BOX
ESDRAS RAMIREZ  69y, Male    All available historical data reviewed    OVERNIGHT EVENTS:  s/p ray amputation     ROS:  General: Denies rigors, nightsweats  HEENT: Denies headache, rhinorrhea, sore throat, eye pain  CV: Denies CP, palpitations  PULM: Denies wheezing, hemoptysis  GI: Denies hematemesis, hematochezia, melena  : Denies discharge, hematuria  MSK: Denies arthralgias, myalgias  SKIN: Denies rash, lesions  NEURO: Denies paresthesias, weakness  PSYCH: Denies depression, anxiety    VITALS:  T(F): 98.3, Max: 98.3 (02-14-25 @ 05:30)  HR: 100  BP: 123/83  RR: 18Vital Signs Last 24 Hrs  T(C): 36.8 (14 Feb 2025 05:30), Max: 36.8 (14 Feb 2025 05:30)  T(F): 98.3 (14 Feb 2025 05:30), Max: 98.3 (14 Feb 2025 05:30)  HR: 100 (14 Feb 2025 05:30) (56 - 100)  BP: 123/83 (14 Feb 2025 05:30) (112/60 - 141/65)  BP(mean): 102 (14 Feb 2025 05:30) (81 - 102)  RR: 18 (14 Feb 2025 05:30) (16 - 18)  SpO2: 98% (14 Feb 2025 05:30) (95% - 100%)    Parameters below as of 14 Feb 2025 05:30  Patient On (Oxygen Delivery Method): room air        TESTS & MEASUREMENTS:              Culture - Blood (collected 02-10-25 @ 15:34)  Source: .Blood BLOOD  Preliminary Report (02-13-25 @ 23:01):    No growth at 72 Hours    Culture - Blood (collected 02-10-25 @ 15:34)  Source: .Blood BLOOD  Preliminary Report (02-13-25 @ 23:01):    No growth at 72 Hours            Social History:  Tobacco Use: No  Alcohol Use: No  Drug Use: No    RADIOLOGY & ADDITIONAL TESTS:  Personal review of radiological diagnostics performed  Echo and EKG results noted when applicable.     MEDICATIONS:  acetaminophen     Tablet .. 650 milliGRAM(s) Oral every 6 hours PRN  amLODIPine   Tablet 10 milliGRAM(s) Oral daily  ampicillin/sulbactam  IVPB 3 Gram(s) IV Intermittent every 6 hours  buPROPion XL (24-Hour) . 150 milliGRAM(s) Oral daily  chlorhexidine 2% Cloths 1 Application(s) Topical <User Schedule>  dextrose 5%. 1000 milliLiter(s) IV Continuous <Continuous>  dextrose 5%. 1000 milliLiter(s) IV Continuous <Continuous>  dextrose 50% Injectable 25 Gram(s) IV Push once  dextrose 50% Injectable 25 Gram(s) IV Push once  dextrose Oral Gel 15 Gram(s) Oral once PRN  enalapril 30 milliGRAM(s) Oral daily  enoxaparin Injectable 40 milliGRAM(s) SubCutaneous every 24 hours  glucagon  Injectable 1 milliGRAM(s) IntraMuscular once  hydrochlorothiazide 12.5 milliGRAM(s) Oral daily  insulin lispro (ADMELOG) corrective regimen sliding scale   SubCutaneous three times a day before meals  LORazepam     Tablet 1 milliGRAM(s) Oral daily PRN  melatonin 5 milliGRAM(s) Oral at bedtime  morphine  - Injectable 2 milliGRAM(s) IV Push every 4 hours PRN  oxycodone    5 mG/acetaminophen 325 mG 1 Tablet(s) Oral every 6 hours PRN  zolpidem 5 milliGRAM(s) Oral at bedtime PRN      ANTIBIOTICS:  ampicillin/sulbactam  IVPB 3 Gram(s) IV Intermittent every 6 hours

## 2025-02-14 NOTE — DISCHARGE NOTE PROVIDER - CARE PROVIDER_API CALL
Kaushik Angulo.  Podiatric Medicine and Surgery  [address] [place] [id number]  Phone: [phone]  Fax: [phone]  Follow Up Time: 1 week

## 2025-02-14 NOTE — PHYSICAL THERAPY INITIAL EVALUATION ADULT - GAIT TRAINING, PT EVAL
CGA in ambulation 25 ft using RW and able to manage 4 steps with Min A using 1HR plus appropriate AD by DC.

## 2025-02-14 NOTE — DISCHARGE NOTE NURSING/CASE MANAGEMENT/SOCIAL WORK - FINANCIAL ASSISTANCE
[facility] provides services at a reduced cost to those who are determined to be eligible through [facility]’s financial assistance program. Information regarding [facility]’s financial assistance program can be found by going to [email or web address] or by calling [phone].

## 2025-02-14 NOTE — PROGRESS NOTE ADULT - ASSESSMENT
· Assessment	  69-year-old male with a past medical history of hypertension, diabetes, rheumatoid arthritis sent in by podiatry for left foot wound.  Patient obtained an laceration to his left second toe the beginning of last month and has experienced trouble healing since then. pt denies any other symptoms including fevers, chill, headache, recent illness/travel, cough, abdominal pain, chest pain, or SOB.    ED vitals: 124/72 HR: 77 RR: 18 T:98 SpO2: 99%    Labs: ESR 18 / wbc 9.49 / lactate 2.4     Xray of foot:  No definite acute displaced fracture or dislocation. Tarsal/metatarsal   alignment appears maintained. Stable postoperative changes of the great   toe and metatarsals. Osteopenia and degenerative changes without   difference    All recent tests and past cultures reviewed.  Prior documents reviewed  Imaging reviewed  Discussed with primary team  Antibiotic based on resistance of microbes identified.     IMPRESSION/RECOMMENDATIONS  Immunosuppression/Immunosenescence ( above age 60 yrs there is a exponential decline in immunity which could result in poor clinical outcomes.   2/13 S/p ray amputation R foot 2nd toe with underlying chronic OM of distal phalanx  < from: MR Foot w/ IV Cont, Left (02.11.25 @ 15:50) > ( Independent interpretation of test : OM  Osteomyelitis involving the second distal and middle phalanges, and   likely the distal half of the second proximal phalanx, as above.    < end of copied text >    Examined today at the bedside with Podiatry. Surgical site not infected  No PAD  2/10 BCx NG  2/10 XR of foot : No definite acute displaced fracture or dislocation. ( Independent interpretation of test : no OM  CBC :  ( WBC 80  ), ( Hg 12.7 ), CMP ( Cr 1.0   ) reviewed .   ESR 18    hypertension  Diabetes  Rheumatoid arthritis    -Off loading to prevent pressure sores and preventive measures to avoid aspiration  -change to po Augmentin 875 mg q12h for 7 more days    Discussion of management/test results/antibiotic regimen  with primary medical team. Dr Kay

## 2025-02-15 LAB
CULTURE RESULTS: SIGNIFICANT CHANGE UP
CULTURE RESULTS: SIGNIFICANT CHANGE UP
SPECIMEN SOURCE: SIGNIFICANT CHANGE UP
SPECIMEN SOURCE: SIGNIFICANT CHANGE UP

## 2025-02-16 LAB
-  CLINDAMYCIN: SIGNIFICANT CHANGE UP
-  DAPTOMYCIN: SIGNIFICANT CHANGE UP
-  DAPTOMYCIN: SIGNIFICANT CHANGE UP
-  ERYTHROMYCIN: SIGNIFICANT CHANGE UP
-  GENTAMICIN: SIGNIFICANT CHANGE UP
-  LINEZOLID: SIGNIFICANT CHANGE UP
-  LINEZOLID: SIGNIFICANT CHANGE UP
-  OXACILLIN: SIGNIFICANT CHANGE UP
-  PENICILLIN: SIGNIFICANT CHANGE UP
-  PENICILLIN: SIGNIFICANT CHANGE UP
-  RIFAMPIN: SIGNIFICANT CHANGE UP
-  TETRACYCLINE: SIGNIFICANT CHANGE UP
-  TRIMETHOPRIM/SULFAMETHOXAZOLE: SIGNIFICANT CHANGE UP
-  VANCOMYCIN: SIGNIFICANT CHANGE UP
METHOD TYPE: SIGNIFICANT CHANGE UP

## 2025-02-17 LAB
-  CLINDAMYCIN: SIGNIFICANT CHANGE UP
-  CLINDAMYCIN: SIGNIFICANT CHANGE UP
-  ERYTHROMYCIN: SIGNIFICANT CHANGE UP
-  ERYTHROMYCIN: SIGNIFICANT CHANGE UP
-  GENTAMICIN: SIGNIFICANT CHANGE UP
-  GENTAMICIN: SIGNIFICANT CHANGE UP
-  OXACILLIN: SIGNIFICANT CHANGE UP
-  OXACILLIN: SIGNIFICANT CHANGE UP
-  PENICILLIN: SIGNIFICANT CHANGE UP
-  PENICILLIN: SIGNIFICANT CHANGE UP
-  RIFAMPIN: SIGNIFICANT CHANGE UP
-  RIFAMPIN: SIGNIFICANT CHANGE UP
-  TETRACYCLINE: SIGNIFICANT CHANGE UP
-  TETRACYCLINE: SIGNIFICANT CHANGE UP
-  TRIMETHOPRIM/SULFAMETHOXAZOLE: SIGNIFICANT CHANGE UP
-  TRIMETHOPRIM/SULFAMETHOXAZOLE: SIGNIFICANT CHANGE UP
-  VANCOMYCIN: SIGNIFICANT CHANGE UP
-  VANCOMYCIN: SIGNIFICANT CHANGE UP
METHOD TYPE: SIGNIFICANT CHANGE UP
METHOD TYPE: SIGNIFICANT CHANGE UP

## 2025-02-18 ENCOUNTER — APPOINTMENT (OUTPATIENT)
Dept: ORTHOPEDIC SURGERY | Facility: CLINIC | Age: 70
End: 2025-02-18

## 2025-02-19 LAB
CULTURE RESULTS: ABNORMAL
CULTURE RESULTS: ABNORMAL
ORGANISM # SPEC MICROSCOPIC CNT: ABNORMAL
ORGANISM # SPEC MICROSCOPIC CNT: SIGNIFICANT CHANGE UP
ORGANISM # SPEC MICROSCOPIC CNT: SIGNIFICANT CHANGE UP
SPECIMEN SOURCE: SIGNIFICANT CHANGE UP
SPECIMEN SOURCE: SIGNIFICANT CHANGE UP

## 2025-02-21 LAB — SURGICAL PATHOLOGY STUDY: SIGNIFICANT CHANGE UP

## 2025-02-26 DIAGNOSIS — Z79.84 LONG TERM (CURRENT) USE OF ORAL HYPOGLYCEMIC DRUGS: ICD-10-CM

## 2025-02-26 DIAGNOSIS — T81.33XA DISRUPTION OF TRAUMATIC INJURY WOUND REPAIR, INITIAL ENCOUNTER: ICD-10-CM

## 2025-02-26 DIAGNOSIS — E11.40 TYPE 2 DIABETES MELLITUS WITH DIABETIC NEUROPATHY, UNSPECIFIED: ICD-10-CM

## 2025-02-26 DIAGNOSIS — L03.032 CELLULITIS OF LEFT TOE: ICD-10-CM

## 2025-02-26 DIAGNOSIS — T81.42XA INFECTION FOLLOWING A PROCEDURE, DEEP INCISIONAL SURGICAL SITE, INITIAL ENCOUNTER: ICD-10-CM

## 2025-02-26 DIAGNOSIS — F41.9 ANXIETY DISORDER, UNSPECIFIED: ICD-10-CM

## 2025-02-26 DIAGNOSIS — M85.872 OTHER SPECIFIED DISORDERS OF BONE DENSITY AND STRUCTURE, LEFT ANKLE AND FOOT: ICD-10-CM

## 2025-02-26 DIAGNOSIS — L97.526 NON-PRESSURE CHRONIC ULCER OF OTHER PART OF LEFT FOOT WITH BONE INVOLVEMENT WITHOUT EVIDENCE OF NECROSIS: ICD-10-CM

## 2025-02-26 DIAGNOSIS — M06.9 RHEUMATOID ARTHRITIS, UNSPECIFIED: ICD-10-CM

## 2025-02-26 DIAGNOSIS — F32.A DEPRESSION, UNSPECIFIED: ICD-10-CM
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
## 2025-02-26 DIAGNOSIS — Z79.85 LONG-TERM (CURRENT) USE OF INJECTABLE NON-INSULIN ANTIDIABETIC DRUGS: ICD-10-CM

## 2025-02-26 DIAGNOSIS — I10 ESSENTIAL (PRIMARY) HYPERTENSION: ICD-10-CM

## 2025-02-26 DIAGNOSIS — E11.69 TYPE 2 DIABETES MELLITUS WITH OTHER SPECIFIED COMPLICATION: ICD-10-CM

## 2025-02-26 DIAGNOSIS — M86.8X7 OTHER OSTEOMYELITIS, ANKLE AND FOOT: ICD-10-CM

## 2025-02-26 DIAGNOSIS — E11.621 TYPE 2 DIABETES MELLITUS WITH FOOT ULCER: ICD-10-CM

## 2025-04-30 NOTE — DISCHARGE NOTE PROVIDER - ATTENDING DISCHARGE PHYSICAL EXAM:
04/30/25 4:44 PM        The office's request has been received, reviewed, and the patient chart updated. The PCP has successfully been removed with a patient attribution note. This message will now be completed.        Thank you  Shirin Agrawal   Attending Discharge Physical Examination:

## 2025-05-01 ENCOUNTER — APPOINTMENT (OUTPATIENT)
Dept: ORTHOPEDIC SURGERY | Facility: CLINIC | Age: 70
End: 2025-05-01

## 2025-05-01 DIAGNOSIS — M25.512 PAIN IN RIGHT SHOULDER: ICD-10-CM

## 2025-05-01 DIAGNOSIS — M25.511 PAIN IN RIGHT SHOULDER: ICD-10-CM

## 2025-05-01 PROCEDURE — 20610 DRAIN/INJ JOINT/BURSA W/O US: CPT | Mod: LT

## 2025-05-01 PROCEDURE — 99213 OFFICE O/P EST LOW 20 MIN: CPT | Mod: 25

## 2025-05-21 ENCOUNTER — APPOINTMENT (OUTPATIENT)
Dept: PAIN MANAGEMENT | Facility: CLINIC | Age: 70
End: 2025-05-21
Payer: OTHER MISCELLANEOUS

## 2025-05-21 DIAGNOSIS — G89.29 OTHER CHRONIC PAIN: ICD-10-CM

## 2025-05-21 DIAGNOSIS — Z78.9 OTHER SPECIFIED HEALTH STATUS: ICD-10-CM

## 2025-05-21 DIAGNOSIS — Z86.39 PERSONAL HISTORY OF OTHER ENDOCRINE, NUTRITIONAL AND METABOLIC DISEASE: ICD-10-CM

## 2025-05-21 PROCEDURE — 99203 OFFICE O/P NEW LOW 30 MIN: CPT

## 2025-05-21 RX ORDER — BUPROPION HYDROCHLORIDE 75 MG/1
TABLET, FILM COATED ORAL
Refills: 0 | Status: ACTIVE | COMMUNITY

## 2025-05-21 RX ORDER — ENALAPRIL MALEATE 5 MG/1
TABLET ORAL
Refills: 0 | Status: ACTIVE | COMMUNITY

## 2025-05-21 RX ORDER — SEMAGLUTIDE 2.68 MG/ML
INJECTION, SOLUTION SUBCUTANEOUS
Refills: 0 | Status: ACTIVE | COMMUNITY

## 2025-05-21 RX ORDER — METFORMIN HYDROCHLORIDE 750 MG/1
TABLET ORAL
Refills: 0 | Status: ACTIVE | COMMUNITY

## 2025-05-21 RX ORDER — AMLODIPINE BESYLATE 5 MG/1
TABLET ORAL
Refills: 0 | Status: ACTIVE | COMMUNITY

## 2025-06-18 ENCOUNTER — APPOINTMENT (OUTPATIENT)
Dept: PAIN MANAGEMENT | Facility: CLINIC | Age: 70
End: 2025-06-18

## 2025-08-07 ENCOUNTER — APPOINTMENT (OUTPATIENT)
Dept: ORTHOPEDIC SURGERY | Facility: CLINIC | Age: 70
End: 2025-08-07